# Patient Record
Sex: FEMALE | Race: WHITE | Employment: OTHER | URBAN - METROPOLITAN AREA
[De-identification: names, ages, dates, MRNs, and addresses within clinical notes are randomized per-mention and may not be internally consistent; named-entity substitution may affect disease eponyms.]

---

## 2017-01-16 ENCOUNTER — TELEPHONE (OUTPATIENT)
Dept: FAMILY MEDICINE CLINIC | Facility: CLINIC | Age: 42
End: 2017-01-16

## 2017-01-16 NOTE — TELEPHONE ENCOUNTER
A request was received from [address] [place] [address] Prisca  [address] for medical records for the past 5 years. The request was sent to [employer] on 1/16/17.

## 2017-01-26 ENCOUNTER — TELEPHONE (OUTPATIENT)
Dept: FAMILY MEDICINE CLINIC | Facility: CLINIC | Age: 42
End: 2017-01-26

## 2017-01-26 NOTE — TELEPHONE ENCOUNTER
Received notice from [employer] to cancel processing of APS request for this pt. They are asking to please Do Not Deposit the check should you have received it after this notice. Sending note in green bag to [employer] 1/26/17.

## 2017-02-23 ENCOUNTER — TELEPHONE (OUTPATIENT)
Dept: FAMILY MEDICINE CLINIC | Facility: CLINIC | Age: 42
End: 2017-02-23

## 2017-02-23 RX ORDER — LEVOTHYROXINE SODIUM 112 UG/1
TABLET ORAL
Qty: 30 TABLET | Refills: 1 | Status: SHIPPED | OUTPATIENT
Start: 2017-02-23 | End: 2018-10-30 | Stop reason: DRUGHIGH

## 2017-02-23 RX ORDER — VALACYCLOVIR HYDROCHLORIDE 500 MG/1
500 TABLET, FILM COATED ORAL DAILY
Qty: 30 TABLET | Refills: 0 | Status: SHIPPED | OUTPATIENT
Start: 2017-02-23 | End: 2017-04-17

## 2017-02-23 RX ORDER — LEVOTHYROXINE SODIUM 0.12 MG/1
TABLET ORAL
Qty: 30 TABLET | Refills: 1 | Status: SHIPPED | OUTPATIENT
Start: 2017-02-23 | End: 2017-08-16

## 2017-02-23 NOTE — TELEPHONE ENCOUNTER
Pt has to discuss these medications with the nurse because she has insurance issues and she needs to possibly have different dosages for them,  Levothyroxine 125 and 112mg and Valacyclovir 1000mg.

## 2017-02-23 NOTE — TELEPHONE ENCOUNTER
Spoke to patient and just completed a divorce and if she goes thru [employer] and upgrades her membership she can get the scripts for $10 but the scripts have to be a 30 day script each for the levothyroxine and the Valcyclovir is only covered at 500mg dose

## 2017-04-17 RX ORDER — VALACYCLOVIR HYDROCHLORIDE 500 MG/1
TABLET, FILM COATED ORAL
Qty: 30 TABLET | Refills: 0 | Status: SHIPPED | OUTPATIENT
Start: 2017-04-17 | End: 2017-05-24

## 2017-05-22 RX ORDER — VALACYCLOVIR HYDROCHLORIDE 500 MG/1
TABLET, FILM COATED ORAL
Qty: 30 TABLET | Refills: 0 | OUTPATIENT
Start: 2017-05-22

## 2017-05-25 RX ORDER — VALACYCLOVIR HYDROCHLORIDE 500 MG/1
TABLET, FILM COATED ORAL
Qty: 30 TABLET | Refills: 0 | Status: SHIPPED | OUTPATIENT
Start: 2017-05-25 | End: 2017-07-06

## 2017-05-25 RX ORDER — VALACYCLOVIR HYDROCHLORIDE 500 MG/1
TABLET, FILM COATED ORAL
Qty: 30 TABLET | Refills: 0 | Status: CANCELLED | OUTPATIENT
Start: 2017-05-25

## 2017-05-25 NOTE — TELEPHONE ENCOUNTER
Natalie Bourgeois    Her last OV was 8/2016.   She needs an appt for this med or ok to refill?  thanks

## 2017-07-07 RX ORDER — VALACYCLOVIR HYDROCHLORIDE 500 MG/1
TABLET, FILM COATED ORAL
Qty: 30 TABLET | Refills: 2 | Status: SHIPPED | OUTPATIENT
Start: 2017-07-07 | End: 2017-10-23

## 2017-08-09 RX ORDER — LEVOTHYROXINE SODIUM 112 UG/1
TABLET ORAL
Qty: 30 TABLET | Refills: 1 | OUTPATIENT
Start: 2017-08-09

## 2017-08-09 RX ORDER — LEVOTHYROXINE SODIUM 0.12 MG/1
TABLET ORAL
Qty: 30 TABLET | Refills: 1 | OUTPATIENT
Start: 2017-08-09

## 2017-08-15 ENCOUNTER — LAB ENCOUNTER (OUTPATIENT)
Dept: LAB | Age: 42
End: 2017-08-15
Attending: FAMILY MEDICINE
Payer: COMMERCIAL

## 2017-08-15 ENCOUNTER — OFFICE VISIT (OUTPATIENT)
Dept: FAMILY MEDICINE CLINIC | Facility: CLINIC | Age: 42
End: 2017-08-15

## 2017-08-15 VITALS
SYSTOLIC BLOOD PRESSURE: 112 MMHG | RESPIRATION RATE: 16 BRPM | TEMPERATURE: 98 F | HEART RATE: 88 BPM | OXYGEN SATURATION: 99 % | DIASTOLIC BLOOD PRESSURE: 74 MMHG | BODY MASS INDEX: 24.27 KG/M2 | WEIGHT: 151 LBS | HEIGHT: 66.25 IN

## 2017-08-15 DIAGNOSIS — E89.0 POST-SURGICAL HYPOTHYROIDISM: ICD-10-CM

## 2017-08-15 DIAGNOSIS — Z85.850 HISTORY OF PAPILLARY ADENOCARCINOMA OF THYROID: ICD-10-CM

## 2017-08-15 DIAGNOSIS — G43.909 MIGRAINE WITHOUT STATUS MIGRAINOSUS, NOT INTRACTABLE, UNSPECIFIED MIGRAINE TYPE: ICD-10-CM

## 2017-08-15 DIAGNOSIS — Z00.00 WELL FEMALE EXAM WITHOUT GYNECOLOGICAL EXAM: Primary | ICD-10-CM

## 2017-08-15 DIAGNOSIS — Z85.850 HISTORY OF THYROID CANCER: ICD-10-CM

## 2017-08-15 DIAGNOSIS — Z00.00 LABORATORY EXAMINATION ORDERED AS PART OF A ROUTINE GENERAL MEDICAL EXAMINATION: ICD-10-CM

## 2017-08-15 DIAGNOSIS — Z00.00 WELL FEMALE EXAM WITHOUT GYNECOLOGICAL EXAM: ICD-10-CM

## 2017-08-15 LAB
25-HYDROXYVITAMIN D (TOTAL): 31.9 NG/ML (ref 30–100)
ALBUMIN SERPL-MCNC: 3.9 G/DL (ref 3.5–4.8)
ALP LIVER SERPL-CCNC: 86 U/L (ref 37–98)
ALT SERPL-CCNC: 12 U/L (ref 14–54)
AST SERPL-CCNC: 16 U/L (ref 15–41)
BASOPHILS # BLD AUTO: 0.06 X10(3) UL (ref 0–0.1)
BASOPHILS NFR BLD AUTO: 1.1 %
BILIRUB SERPL-MCNC: 0.3 MG/DL (ref 0.1–2)
BUN BLD-MCNC: 10 MG/DL (ref 8–20)
CALCIUM BLD-MCNC: 8.9 MG/DL (ref 8.3–10.3)
CHLORIDE: 107 MMOL/L (ref 101–111)
CHOLEST SMN-MCNC: 201 MG/DL (ref ?–200)
CO2: 26 MMOL/L (ref 22–32)
CREAT BLD-MCNC: 0.66 MG/DL (ref 0.55–1.02)
EOSINOPHIL # BLD AUTO: 0.14 X10(3) UL (ref 0–0.3)
EOSINOPHIL NFR BLD AUTO: 2.5 %
ERYTHROCYTE [DISTWIDTH] IN BLOOD BY AUTOMATED COUNT: 13.2 % (ref 11.5–16)
FREE T4: 1 NG/DL (ref 0.9–1.8)
GLUCOSE BLD-MCNC: 84 MG/DL (ref 70–99)
HCT VFR BLD AUTO: 39.5 % (ref 34–50)
HDLC SERPL-MCNC: 66 MG/DL (ref 45–?)
HDLC SERPL: 3.05 {RATIO} (ref ?–4.44)
HGB BLD-MCNC: 12.9 G/DL (ref 12–16)
IMMATURE GRANULOCYTE COUNT: 0.03 X10(3) UL (ref 0–1)
IMMATURE GRANULOCYTE RATIO %: 0.5 %
LDLC SERPL CALC-MCNC: 110 MG/DL (ref ?–130)
LDLC SERPL-MCNC: 25 MG/DL (ref 5–40)
LYMPHOCYTES # BLD AUTO: 1.81 X10(3) UL (ref 0.9–4)
LYMPHOCYTES NFR BLD AUTO: 32 %
M PROTEIN MFR SERPL ELPH: 7.1 G/DL (ref 6.1–8.3)
MCH RBC QN AUTO: 30.1 PG (ref 27–33.2)
MCHC RBC AUTO-ENTMCNC: 32.7 G/DL (ref 31–37)
MCV RBC AUTO: 92.1 FL (ref 81–100)
MONOCYTES # BLD AUTO: 0.31 X10(3) UL (ref 0.1–0.6)
MONOCYTES NFR BLD AUTO: 5.5 %
NEUTROPHIL ABS PRELIM: 3.31 X10 (3) UL (ref 1.3–6.7)
NEUTROPHILS # BLD AUTO: 3.31 X10(3) UL (ref 1.3–6.7)
NEUTROPHILS NFR BLD AUTO: 58.4 %
NONHDLC SERPL-MCNC: 135 MG/DL (ref ?–130)
PLATELET # BLD AUTO: 208 10(3)UL (ref 150–450)
POTASSIUM SERPL-SCNC: 4.2 MMOL/L (ref 3.6–5.1)
RBC # BLD AUTO: 4.29 X10(6)UL (ref 3.8–5.1)
RED CELL DISTRIBUTION WIDTH-SD: 44.8 FL (ref 35.1–46.3)
SODIUM SERPL-SCNC: 140 MMOL/L (ref 136–144)
T3 SERPL-MCNC: 71 NG/DL (ref 60–181)
TRIGLYCERIDES: 126 MG/DL (ref ?–150)
TSI SER-ACNC: 2.94 MIU/ML (ref 0.35–5.5)
WBC # BLD AUTO: 5.7 X10(3) UL (ref 4–13)

## 2017-08-15 PROCEDURE — 36415 COLL VENOUS BLD VENIPUNCTURE: CPT

## 2017-08-15 PROCEDURE — 99396 PREV VISIT EST AGE 40-64: CPT | Performed by: FAMILY MEDICINE

## 2017-08-15 PROCEDURE — 85025 COMPLETE CBC W/AUTO DIFF WBC: CPT

## 2017-08-15 PROCEDURE — 82306 VITAMIN D 25 HYDROXY: CPT

## 2017-08-15 PROCEDURE — G0438 PPPS, INITIAL VISIT: HCPCS | Performed by: FAMILY MEDICINE

## 2017-08-15 PROCEDURE — 84443 ASSAY THYROID STIM HORMONE: CPT

## 2017-08-15 PROCEDURE — 80053 COMPREHEN METABOLIC PANEL: CPT

## 2017-08-15 PROCEDURE — 80061 LIPID PANEL: CPT

## 2017-08-15 PROCEDURE — 84480 ASSAY TRIIODOTHYRONINE (T3): CPT

## 2017-08-15 PROCEDURE — 84439 ASSAY OF FREE THYROXINE: CPT

## 2017-08-15 RX ORDER — SUMATRIPTAN 100 MG/1
100 TABLET, FILM COATED ORAL EVERY 2 HOUR PRN
Qty: 9 TABLET | Refills: 1 | Status: SHIPPED | OUTPATIENT
Start: 2017-08-15 | End: 2018-10-30

## 2017-08-15 RX ORDER — CODEINE/BUTALBITAL/ASA/CAFFEIN 30-50-325
CAPSULE ORAL
Qty: 30 CAPSULE | Refills: 0 | Status: SHIPPED | OUTPATIENT
Start: 2017-08-15 | End: 2018-07-17

## 2017-08-15 NOTE — PROGRESS NOTES
HPI:   Jw Felton is a 40year old female who presents for a complete physical exam. Symptoms: IUD placed 3/2015 no periods. Migraines vary has had them since 7 y/o Fiorinal only thing that works.  Imitrex generic is covered nasal spray version last F 125 MCG Oral Tab Alternate 125mcg every other day with 112mcg Disp: 30 tablet Rfl: 1   Levothyroxine Sodium 112 MCG Oral Tab Alternate 112mcg every other day with 125mcg Disp: 30 tablet Rfl: 1   nystatin-triamcinolone 885480-1.1 UNIT/GM-% External Cream Ap pain  CARDIOVASCULAR: denies chest pain or tightness on exertion: no edema  VASCULAR: denies leg cramps  GI: denies abdominal pain, bowel movement changes, blood in stool  : denies urinary problems, vaginal discharge or discomfort,   MUSCULOSKELETAL: den medical examination      Orders Placed This Encounter      mmm cbc      mmm cmp      mmm lipid      mmm TSH and Free T4 [E]      mmm vit d      mmm (T3) Total [E]    Meds & Refills for this Visit:  Signed Prescriptions Disp Refills    SUMAtriptan Succinate

## 2017-08-15 NOTE — PROGRESS NOTES
Cholesterol mild elevation try to reduce saturated fats eat more fruits vegetables repeat yearly. TSH is too high patient should now start taking levothyroxine 125 mcg every day. Repeat TSH, T4 free and T3 total in 3 months.   Order future tests/medicatio

## 2017-08-16 ENCOUNTER — TELEPHONE (OUTPATIENT)
Dept: FAMILY MEDICINE CLINIC | Facility: CLINIC | Age: 42
End: 2017-08-16

## 2017-08-16 DIAGNOSIS — E03.9 HYPOTHYROIDISM, UNSPECIFIED TYPE: Primary | ICD-10-CM

## 2017-08-16 RX ORDER — LEVOTHYROXINE SODIUM 0.12 MG/1
TABLET ORAL
Qty: 90 TABLET | Refills: 0 | Status: SHIPPED | OUTPATIENT
Start: 2017-08-16 | End: 2017-12-06

## 2017-08-16 NOTE — TELEPHONE ENCOUNTER
Cholesterol mild elevation try to reduce saturated fats eat more fruits vegetables repeat yearly. TSH is too high start taking levothyroxine 125 mcg every day.  Repeat TSH, T4 free and T3 total in 3 months.    Sincerely,   Hunter Inman PA-C     Lab or

## 2017-08-16 NOTE — TELEPHONE ENCOUNTER
LevotLevothyroxine Sodium 125 MCG Oral Tabhyroxine Sodium 125 MCG Oral Tab    Pt was seen yesterday. She was suppose to have refill sent to [facility]. Pharmacy doesn't have anything.

## 2017-10-23 RX ORDER — VALACYCLOVIR HYDROCHLORIDE 500 MG/1
TABLET, FILM COATED ORAL
Qty: 30 TABLET | Refills: 2 | Status: SHIPPED | OUTPATIENT
Start: 2017-10-23 | End: 2018-02-12

## 2017-12-06 RX ORDER — LEVOTHYROXINE SODIUM 0.12 MG/1
TABLET ORAL
Qty: 30 TABLET | Refills: 0 | Status: SHIPPED | OUTPATIENT
Start: 2017-12-06 | End: 2018-01-10

## 2017-12-06 NOTE — TELEPHONE ENCOUNTER
rx approved qty 30 NR  Patient past due to complete thyroid labs  Copy of lab orders mailed to patient

## 2017-12-28 ENCOUNTER — LAB ENCOUNTER (OUTPATIENT)
Dept: LAB | Age: 42
End: 2017-12-28
Attending: FAMILY MEDICINE
Payer: COMMERCIAL

## 2017-12-28 DIAGNOSIS — E03.9 HYPOTHYROIDISM, UNSPECIFIED TYPE: ICD-10-CM

## 2017-12-28 PROCEDURE — 84480 ASSAY TRIIODOTHYRONINE (T3): CPT

## 2017-12-28 PROCEDURE — 36415 COLL VENOUS BLD VENIPUNCTURE: CPT

## 2017-12-28 PROCEDURE — 84439 ASSAY OF FREE THYROXINE: CPT

## 2017-12-28 PROCEDURE — 84443 ASSAY THYROID STIM HORMONE: CPT

## 2018-01-10 RX ORDER — LEVOTHYROXINE SODIUM 0.12 MG/1
TABLET ORAL
Qty: 90 TABLET | Refills: 1 | Status: SHIPPED | OUTPATIENT
Start: 2018-01-10 | End: 2018-10-02

## 2018-02-12 RX ORDER — VALACYCLOVIR HYDROCHLORIDE 500 MG/1
500 TABLET, FILM COATED ORAL DAILY
Qty: 30 TABLET | Refills: 2 | Status: SHIPPED
Start: 2018-02-12 | End: 2018-07-03

## 2018-02-12 NOTE — TELEPHONE ENCOUNTER
From: Keara Rome  Sent: 2/12/2018 11:48 AM CST  Subject: Medication Renewal Request    Keara Rome would like a refill of the following medications:     VALACYCLOVIR  MG Oral Tab Molly Moncada PA-C]   Patient Comment: [facility]

## 2018-07-03 RX ORDER — VALACYCLOVIR HYDROCHLORIDE 500 MG/1
TABLET, FILM COATED ORAL
Qty: 30 TABLET | Refills: 2 | Status: SHIPPED | OUTPATIENT
Start: 2018-07-03 | End: 2018-11-12

## 2018-07-17 RX ORDER — CODEINE/BUTALBITAL/ASA/CAFFEIN 30-50-325
CAPSULE ORAL
Qty: 30 CAPSULE | Refills: 0 | Status: SHIPPED | OUTPATIENT
Start: 2018-07-17 | End: 2018-10-30

## 2018-07-17 NOTE — TELEPHONE ENCOUNTER
The patient is requesting a refill on her migraine medication. She asked that we make sure to send over the one with aspirin in it because when she picked it up last time it had Tylenol in it.

## 2018-10-02 RX ORDER — LEVOTHYROXINE SODIUM 0.12 MG/1
TABLET ORAL
Qty: 30 TABLET | Refills: 0 | Status: SHIPPED | OUTPATIENT
Start: 2018-10-02 | End: 2019-07-17

## 2018-10-02 NOTE — TELEPHONE ENCOUNTER
Levothyroxine approved qty 30 NR  Patient needs appt for further refills  Please call to schedule appt for annual wellness  [phone] (home)

## 2018-10-30 ENCOUNTER — OFFICE VISIT (OUTPATIENT)
Dept: FAMILY MEDICINE CLINIC | Facility: CLINIC | Age: 43
End: 2018-10-30

## 2018-10-30 ENCOUNTER — LAB ENCOUNTER (OUTPATIENT)
Dept: LAB | Age: 43
End: 2018-10-30
Attending: FAMILY MEDICINE
Payer: COMMERCIAL

## 2018-10-30 VITALS
WEIGHT: 150 LBS | HEIGHT: 67 IN | SYSTOLIC BLOOD PRESSURE: 90 MMHG | HEART RATE: 80 BPM | BODY MASS INDEX: 23.54 KG/M2 | DIASTOLIC BLOOD PRESSURE: 60 MMHG

## 2018-10-30 DIAGNOSIS — Z00.00 LABORATORY EXAMINATION ORDERED AS PART OF A ROUTINE GENERAL MEDICAL EXAMINATION: ICD-10-CM

## 2018-10-30 DIAGNOSIS — Z12.4 SCREENING FOR MALIGNANT NEOPLASM OF CERVIX: ICD-10-CM

## 2018-10-30 DIAGNOSIS — Z85.850 HISTORY OF PAPILLARY ADENOCARCINOMA OF THYROID: ICD-10-CM

## 2018-10-30 DIAGNOSIS — E89.0 POST-SURGICAL HYPOTHYROIDISM: ICD-10-CM

## 2018-10-30 DIAGNOSIS — Z86.19 HISTORY OF HERPES GENITALIS: ICD-10-CM

## 2018-10-30 DIAGNOSIS — G43.909 MIGRAINE WITHOUT STATUS MIGRAINOSUS, NOT INTRACTABLE, UNSPECIFIED MIGRAINE TYPE: ICD-10-CM

## 2018-10-30 DIAGNOSIS — Z12.39 BREAST CANCER SCREENING: ICD-10-CM

## 2018-10-30 DIAGNOSIS — Z01.419 WELL FEMALE EXAM WITH ROUTINE GYNECOLOGICAL EXAM: Primary | ICD-10-CM

## 2018-10-30 DIAGNOSIS — D22.9 MULTIPLE PIGMENTED NEVI: ICD-10-CM

## 2018-10-30 PROCEDURE — 84439 ASSAY OF FREE THYROXINE: CPT

## 2018-10-30 PROCEDURE — 80061 LIPID PANEL: CPT

## 2018-10-30 PROCEDURE — 84443 ASSAY THYROID STIM HORMONE: CPT

## 2018-10-30 PROCEDURE — 36415 COLL VENOUS BLD VENIPUNCTURE: CPT

## 2018-10-30 PROCEDURE — 88175 CYTOPATH C/V AUTO FLUID REDO: CPT | Performed by: FAMILY MEDICINE

## 2018-10-30 PROCEDURE — 87624 HPV HI-RISK TYP POOLED RSLT: CPT | Performed by: FAMILY MEDICINE

## 2018-10-30 PROCEDURE — 85025 COMPLETE CBC W/AUTO DIFF WBC: CPT

## 2018-10-30 PROCEDURE — 80053 COMPREHEN METABOLIC PANEL: CPT

## 2018-10-30 PROCEDURE — 99396 PREV VISIT EST AGE 40-64: CPT | Performed by: FAMILY MEDICINE

## 2018-10-30 PROCEDURE — 84480 ASSAY TRIIODOTHYRONINE (T3): CPT

## 2018-10-30 RX ORDER — LEVOTHYROXINE SODIUM 137 UG/1
137 CAPSULE ORAL EVERY MORNING
Qty: 30 CAPSULE | Refills: 5 | Status: SHIPPED | OUTPATIENT
Start: 2018-10-30 | End: 2019-07-17

## 2018-10-30 RX ORDER — CODEINE/BUTALBITAL/ASA/CAFFEIN 30-50-325
CAPSULE ORAL
Qty: 30 CAPSULE | Refills: 0 | Status: SHIPPED | OUTPATIENT
Start: 2018-10-30 | End: 2019-11-21

## 2018-10-31 ENCOUNTER — TELEPHONE (OUTPATIENT)
Dept: FAMILY MEDICINE CLINIC | Facility: CLINIC | Age: 43
End: 2018-10-31

## 2018-10-31 DIAGNOSIS — E89.0 POST-SURGICAL HYPOTHYROIDISM: Primary | ICD-10-CM

## 2018-10-31 NOTE — TELEPHONE ENCOUNTER
----- Message from Darrel Shelby PA-C sent at 10/30/2018  8:13 PM CDT -----  TSH goal is below 1 increase levothyroxine to 137 mcg from 125 mcg. Repeat TSH  in 3 months.   CMP, CBC and lipids normal.  Order future tests/medications sent to my chart

## 2018-10-31 NOTE — TELEPHONE ENCOUNTER
An order was placed for a TSH to be done in 3 months. Broderick Jiménez already sent the new dose of Levothyroxine to the patient's pharmacy yesterday.

## 2018-10-31 NOTE — PROGRESS NOTES
TSH goal is below 1 increase levothyroxine to 137 mcg from 125 mcg. Repeat TSH  in 3 months.   CMP, CBC and lipids normal.  Order future tests/medications sent to my chart

## 2018-11-02 ENCOUNTER — HOSPITAL ENCOUNTER (OUTPATIENT)
Dept: MAMMOGRAPHY | Age: 43
Discharge: HOME | End: 2018-11-02
Attending: FAMILY MEDICINE
Payer: COMMERCIAL

## 2018-11-02 DIAGNOSIS — Z12.39 BREAST CANCER SCREENING: ICD-10-CM

## 2018-11-02 PROCEDURE — 77067 SCR MAMMO BI INCL CAD: CPT | Performed by: FAMILY MEDICINE

## 2018-11-12 RX ORDER — VALACYCLOVIR HYDROCHLORIDE 500 MG/1
TABLET, FILM COATED ORAL
Qty: 30 TABLET | Refills: 2 | Status: SHIPPED | OUTPATIENT
Start: 2018-11-12 | End: 2019-03-05

## 2019-02-28 ENCOUNTER — LAB ENCOUNTER (OUTPATIENT)
Dept: LAB | Age: 44
End: 2019-02-28
Attending: FAMILY MEDICINE
Payer: COMMERCIAL

## 2019-02-28 DIAGNOSIS — E89.0 POST-SURGICAL HYPOTHYROIDISM: ICD-10-CM

## 2019-02-28 LAB — TSI SER-ACNC: 0.23 MIU/ML (ref 0.36–3.74)

## 2019-02-28 PROCEDURE — 36415 COLL VENOUS BLD VENIPUNCTURE: CPT

## 2019-02-28 PROCEDURE — 84443 ASSAY THYROID STIM HORMONE: CPT

## 2019-03-05 RX ORDER — VALACYCLOVIR HYDROCHLORIDE 500 MG/1
TABLET, FILM COATED ORAL
Qty: 90 TABLET | Refills: 0 | Status: SHIPPED | OUTPATIENT
Start: 2019-03-05 | End: 2019-07-29

## 2019-07-17 ENCOUNTER — TELEPHONE (OUTPATIENT)
Dept: FAMILY MEDICINE CLINIC | Facility: CLINIC | Age: 44
End: 2019-07-17

## 2019-07-17 DIAGNOSIS — E89.0 POST-SURGICAL HYPOTHYROIDISM: ICD-10-CM

## 2019-07-17 RX ORDER — LEVOTHYROXINE SODIUM 137 UG/1
137 CAPSULE ORAL EVERY MORNING
Qty: 90 CAPSULE | Refills: 2 | Status: SHIPPED | OUTPATIENT
Start: 2019-07-17 | End: 2019-11-21

## 2019-07-17 NOTE — TELEPHONE ENCOUNTER
Pt requesting refill of levothyroxine, passed protocol , refill approved, sent to pharmacy. Last TSH lab 2/28/2019    Last Time Medication was Filled:  10/2018    Last Office Visit with PCP: 10/30/2018    .

## 2019-07-29 DIAGNOSIS — Z86.19 HISTORY OF HERPES GENITALIS: Primary | ICD-10-CM

## 2019-07-29 RX ORDER — VALACYCLOVIR HYDROCHLORIDE 500 MG/1
TABLET, FILM COATED ORAL
Qty: 90 TABLET | Refills: 0 | Status: SHIPPED | OUTPATIENT
Start: 2019-07-29 | End: 2019-11-21

## 2019-10-17 ENCOUNTER — TELEPHONE (OUTPATIENT)
Dept: FAMILY MEDICINE CLINIC | Facility: CLINIC | Age: 44
End: 2019-10-17

## 2019-10-17 NOTE — TELEPHONE ENCOUNTER
Mirena would have to be ordered and she would need last appointment of the day. A physical can be done also as long as it is last appointment of the day. Not sure how long it takes for Mirena to come in.  Make sure we don't have any in the closet that got

## 2019-10-17 NOTE — TELEPHONE ENCOUNTER
Will initiate PA for Mirena before ordering. Once approved we will call the patient to schedule.      Await insurance response

## 2019-10-17 NOTE — TELEPHONE ENCOUNTER
Patient called state she is due for physical after 10/30 but would like her Mirena removed and a new Mirena inserted. Patients Mirena is not due to be removed until March 2020 and Bia Maxwell did not insert that one.

## 2019-10-29 NOTE — TELEPHONE ENCOUNTER
Unable to reach anyone at the [employer] [facility].     Called patient's health plan to verify if PA was needed. Per [facility] department no PA is required.      Left message for patient advising Masha was in and she can schedule wit

## 2019-11-21 ENCOUNTER — OFFICE VISIT (OUTPATIENT)
Dept: FAMILY MEDICINE CLINIC | Facility: CLINIC | Age: 44
End: 2019-11-21

## 2019-11-21 VITALS
HEART RATE: 88 BPM | HEIGHT: 66.73 IN | SYSTOLIC BLOOD PRESSURE: 88 MMHG | DIASTOLIC BLOOD PRESSURE: 60 MMHG | WEIGHT: 132 LBS | BODY MASS INDEX: 20.96 KG/M2

## 2019-11-21 DIAGNOSIS — E89.0 POST-SURGICAL HYPOTHYROIDISM: ICD-10-CM

## 2019-11-21 DIAGNOSIS — Z86.19 HISTORY OF HERPES GENITALIS: ICD-10-CM

## 2019-11-21 DIAGNOSIS — Z79.899 MEDICATION MANAGEMENT: ICD-10-CM

## 2019-11-21 DIAGNOSIS — Z00.00 WELL FEMALE EXAM WITHOUT GYNECOLOGICAL EXAM: Primary | ICD-10-CM

## 2019-11-21 DIAGNOSIS — Z30.433 ENCOUNTER FOR REMOVAL AND REINSERTION OF INTRAUTERINE CONTRACEPTIVE DEVICE (IUD): ICD-10-CM

## 2019-11-21 DIAGNOSIS — Z00.00 LABORATORY TESTS ORDERED AS PART OF A COMPLETE PHYSICAL EXAM (CPE): ICD-10-CM

## 2019-11-21 DIAGNOSIS — Z12.39 BREAST CANCER SCREENING: ICD-10-CM

## 2019-11-21 DIAGNOSIS — G43.909 MIGRAINE WITHOUT STATUS MIGRAINOSUS, NOT INTRACTABLE, UNSPECIFIED MIGRAINE TYPE: ICD-10-CM

## 2019-11-21 PROCEDURE — G0438 PPPS, INITIAL VISIT: HCPCS | Performed by: FAMILY MEDICINE

## 2019-11-21 PROCEDURE — 81025 URINE PREGNANCY TEST: CPT | Performed by: FAMILY MEDICINE

## 2019-11-21 PROCEDURE — 99213 OFFICE O/P EST LOW 20 MIN: CPT | Performed by: FAMILY MEDICINE

## 2019-11-21 PROCEDURE — 99396 PREV VISIT EST AGE 40-64: CPT | Performed by: FAMILY MEDICINE

## 2019-11-21 PROCEDURE — 58300 INSERT INTRAUTERINE DEVICE: CPT | Performed by: FAMILY MEDICINE

## 2019-11-21 PROCEDURE — 58301 REMOVE INTRAUTERINE DEVICE: CPT | Performed by: FAMILY MEDICINE

## 2019-11-21 RX ORDER — LEVOTHYROXINE SODIUM 137 UG/1
TABLET ORAL
Refills: 2 | COMMUNITY
Start: 2019-11-15 | End: 2021-03-24

## 2019-11-21 RX ORDER — VALACYCLOVIR HYDROCHLORIDE 500 MG/1
500 TABLET, FILM COATED ORAL
Qty: 90 TABLET | Refills: 3 | Status: SHIPPED | OUTPATIENT
Start: 2019-11-21 | End: 2021-01-06

## 2019-11-21 RX ORDER — LEVOTHYROXINE SODIUM 137 UG/1
137 CAPSULE ORAL EVERY MORNING
Qty: 90 CAPSULE | Refills: 2 | Status: SHIPPED | OUTPATIENT
Start: 2019-11-21 | End: 2019-12-21

## 2019-11-21 RX ORDER — CODEINE/BUTALBITAL/ASA/CAFFEIN 30-50-325
CAPSULE ORAL
Qty: 30 CAPSULE | Refills: 0 | Status: SHIPPED | OUTPATIENT
Start: 2019-11-21

## 2019-11-21 NOTE — PROGRESS NOTES
HPI:   Ace Rhodes is a 38year old female who presents for a complete physical exam. Symptoms: IUD mirena, getting it removed and new one placed is due to come out in March and due to heavy periods wants it replaced.   No history of PID rarely gets herpes Outpatient Medications   Medication Sig Dispense Refill   • Levothyroxine Sodium 137 MCG Oral Tab TAKE 1 TABLET BY MOUTH IN THE MORNING  2   • Levothyroxine Sodium 137 MCG Oral Cap Take 137 mcg by mouth every morning.  Stop 125 mcg 90 capsule 2   • valACYcl GENERAL: denies fevers, weakness, trouble sleeping or weight changes  SKIN: denies any unusual skin lesions or rashes  EYES:denies vision changes  HEENT: denies upper respiratory symptoms  LUNGS: denies cough or shortness of breath with exertion  CHEST: was prepped with betadine. The uterus was sounded to 6 cm. The Mirena was set to the sounded depth and the IUD was placed in the uterus. Strings were trimmed at 3 cm. The instruments were removed. The pt tolerated the procedure well.   She was asked t provided recommended low fat diet and aerobic exercise 30 minutes 3-4 times weekly. - TSH+FREE T4; Future    2.  Post-surgical hypothyroidism  Goal is TSH suppression last time TSH goal was perfect patient will get thyroid testing repeated  - Levothyroxin plan.  The patient is asked to return for complete physical yearly.

## 2019-11-25 ENCOUNTER — HOSPITAL ENCOUNTER (OUTPATIENT)
Dept: MAMMOGRAPHY | Age: 44
Discharge: HOME | End: 2019-11-25
Attending: FAMILY MEDICINE
Payer: COMMERCIAL

## 2019-11-25 DIAGNOSIS — Z12.39 BREAST CANCER SCREENING: ICD-10-CM

## 2019-11-25 PROCEDURE — 77067 SCR MAMMO BI INCL CAD: CPT | Performed by: FAMILY MEDICINE

## 2019-11-25 NOTE — PROGRESS NOTES
Mammogram is normal repeat in one year. Forward information to her about the [address] [place] [address]  Result sent to my chart.

## 2020-02-14 NOTE — TELEPHONE ENCOUNTER
After Visit Summary   8/3/2018    Kenn Dobson    MRN: [id number]           Patient Information     Date Of Birth          1986        Visit Information        Provider Department      8/3/2018 11:40 AM Vee Abdalla PA-C [facility]        Today's Diagnoses     Rectal pain    -  1       Follow-ups after your visit        Additional Services     COLORECTAL SURGERY REFERRAL       Your provider has referred you to: N: [facility] [phone]   [email or web address]    Referral Reason(s): rectal pain  Special Concerns: none  This referral is: Elective (week +)  It is OK to leave a message on patient's voicemail. [phone]    Please be aware that coverage of these services is subject to the terms and limitations of your health insurance plan.  Call member services at your health plan with any benefit or coverage questions.      Please bring the following with you to your appointment:    (1) Any X-Rays, CTs or MRIs which have been performed.  Contact the facility where they were done to arrange for  prior to your scheduled appointment.    (2) List of current medications  (3) This referral request   (4) Any documents/labs given to you for this referral                  Who to contact     If you have questions or need follow up information about today's clinic visit or your schedule please contact [facility] directly at [phone].  Normal or non-critical lab and imaging results will be communicated to you by MyChart, letter or phone within 4 business days after the clinic has received the results. If you do not hear from us within 7 days, please contact the clinic through MyChart or phone. If you have a critical or abnormal lab result, we will notify you by phone as soon as possible.  Submit refill requests through [employer] or call your pharmacy and they will forward the refill request to us. 
Pt requesting refill of VALACYCLOVIR  passed protocol , refill approved, sent to pharmacy
"Please allow 3 business days for your refill to be completed.          Additional Information About Your Visit        MyChart Information     [employer] lets you send messages to your doctor, view your test results, renew your prescriptions, schedule appointments and more. To sign up, go to [email or web address] . Click on \"Log in\" on the left side of the screen, which will take you to the Welcome page. Then click on \"Sign up Now\" on the right side of the page.     You will be asked to enter the access code listed below, as well as some personal information. Please follow the directions to create your username and password.     Your access code is: [email or web address]  Expires: 2018 12:09 PM     Your access code will  in 90 days. If you need help or a new code, please call your [place] clinic or [phone].        Care EveryWhere ID     This is your [facility] EveryWhere ID. This could be used by other organizations to access your [place] medical records  [id number]        Your Vitals Were     Pulse Temperature Respirations Pulse Oximetry BMI (Body Mass Index)       66 98.4  F (36.9  C) (Oral) 18 99% 27 kg/m2        Blood Pressure from Last 3 Encounters:   18 145/82   18 110/70   17 130/84    Weight from Last 3 Encounters:   18 175 lb (79.4 kg)   18 175 lb (79.4 kg)   17 164 lb (74.4 kg)              We Performed the Following     COLORECTAL SURGERY REFERRAL          Today's Medication Changes          These changes are accurate as of 8/3/18 12:09 PM.  If you have any questions, ask your nurse or doctor.               These medicines have changed or have updated prescriptions.        Dose/Directions    * hydrocortisone 25 MG Suppository   Commonly known as:  ANUSOL-HC   This may have changed:  Another medication with the same name was added. Make sure you understand how and when to take each.   Used for:  Internal hemorrhoid   Changed by:  Vee Abdalla PA-C        Dose:  "
25 mg   Place 1 suppository (25 mg) rectally 2 times daily as needed for hemorrhoids   Quantity:  20 suppository   Refills:  0       * hydrocortisone 25 MG Suppository   Commonly known as:  ANUSOL-HC   This may have changed:  You were already taking a medication with the same name, and this prescription was added. Make sure you understand how and when to take each.   Used for:  Rectal pain   Changed by:  Vee Abdalla PA-C        Dose:  25 mg   Place 1 suppository (25 mg) rectally 2 times daily   Quantity:  24 suppository   Refills:  1       * Notice:  This list has 2 medication(s) that are the same as other medications prescribed for you. Read the directions carefully, and ask your doctor or other care provider to review them with you.         Where to get your medicines      Some of these will need a paper prescription and others can be bought over the counter.  Ask your nurse if you have questions.     Bring a paper prescription for each of these medications     hydrocortisone 25 MG Suppository                Primary Care Provider Office Phone # Fax #    Patricia Lira MD PhD [phone]       [address]  [facility] [address]        Equal Access to Services     [facility]: Hadii theron almazan Sociera, waaxda luqadaha, qaybta kaalmachristian salinas, dat carl . So [place] [phone].    ATENCIÓN: Si habla español, tiene a badillo disposición servicios gratuitos de asistencia lingüística. [facility] [phone].    We comply with applicable federal civil rights laws and [place] laws. We do not discriminate on the basis of race, color, national origin, age, disability, sex, sexual orientation, or gender identity.            Thank you!     Thank you for choosing [facility]  for your care. Our goal is always to provide you with excellent care. Hearing back from our patients is one way we can continue to improve our services. Please take a few minutes 
to complete the written survey that you may receive in the mail after your visit with us. Thank you!             Your Updated Medication List - Protect others around you: Learn how to safely use, store and throw away your medicines at www.disposemymeds.org.          This list is accurate as of 8/3/18 12:09 PM.  Always use your most recent med list.                   Brand Name Dispense Instructions for use Diagnosis    cetirizine 10 MG tablet    zyrTEC    30 tablet    Take 1 tablet (10 mg) by mouth every evening    Allergic rhinitis, cause unspecified       fluticasone 50 MCG/ACT spray    FLONASE    1 Package    Spray 1-2 sprays into both nostrils daily    Allergic rhinitis, cause unspecified       * hydrocortisone 25 MG Suppository    ANUSOL-HC    20 suppository    Place 1 suppository (25 mg) rectally 2 times daily as needed for hemorrhoids    Internal hemorrhoid       * hydrocortisone 25 MG Suppository    ANUSOL-HC    24 suppository    Place 1 suppository (25 mg) rectally 2 times daily    Rectal pain       Multi-vitamin Tabs tablet      Take 1 tablet by mouth daily        UNABLE TO FIND      MEDICATION NAME: Tumeric        VITAMIN D (CHOLECALCIFEROL) PO      Take 1,000 Units by mouth daily        * Notice:  This list has 2 medication(s) that are the same as other medications prescribed for you. Read the directions carefully, and ask your doctor or other care provider to review them with you.      
65

## 2020-03-02 ENCOUNTER — LAB ENCOUNTER (OUTPATIENT)
Dept: LAB | Age: 45
End: 2020-03-02
Attending: FAMILY MEDICINE
Payer: COMMERCIAL

## 2020-03-02 ENCOUNTER — PATIENT MESSAGE (OUTPATIENT)
Dept: FAMILY MEDICINE CLINIC | Facility: CLINIC | Age: 45
End: 2020-03-02

## 2020-03-02 DIAGNOSIS — D64.9 LOW HEMOGLOBIN: ICD-10-CM

## 2020-03-02 DIAGNOSIS — R79.89 ABNORMAL CBC: ICD-10-CM

## 2020-03-02 DIAGNOSIS — Z00.00 LABORATORY TESTS ORDERED AS PART OF A COMPLETE PHYSICAL EXAM (CPE): ICD-10-CM

## 2020-03-02 DIAGNOSIS — D64.9 LOW HEMOGLOBIN: Primary | ICD-10-CM

## 2020-03-02 LAB
ALBUMIN SERPL-MCNC: 3.6 G/DL (ref 3.4–5)
ALBUMIN/GLOB SERPL: 1.1 {RATIO} (ref 1–2)
ALP LIVER SERPL-CCNC: 74 U/L (ref 37–98)
ALT SERPL-CCNC: 14 U/L (ref 13–56)
ANION GAP SERPL CALC-SCNC: 4 MMOL/L (ref 0–18)
AST SERPL-CCNC: 14 U/L (ref 15–37)
BASOPHILS # BLD AUTO: 0.04 X10(3) UL (ref 0–0.2)
BASOPHILS NFR BLD AUTO: 0.8 %
BILIRUB SERPL-MCNC: 0.3 MG/DL (ref 0.1–2)
BUN BLD-MCNC: 21 MG/DL (ref 7–18)
BUN/CREAT SERPL: 26.3 (ref 10–20)
CALCIUM BLD-MCNC: 8.4 MG/DL (ref 8.5–10.1)
CHLORIDE SERPL-SCNC: 110 MMOL/L (ref 98–112)
CHOLEST SMN-MCNC: 175 MG/DL (ref ?–200)
CO2 SERPL-SCNC: 27 MMOL/L (ref 21–32)
CREAT BLD-MCNC: 0.8 MG/DL (ref 0.55–1.02)
DEPRECATED HBV CORE AB SER IA-ACNC: 6.1 NG/ML (ref 12–240)
DEPRECATED RDW RBC AUTO: 47.6 FL (ref 35.1–46.3)
EOSINOPHIL # BLD AUTO: 0.13 X10(3) UL (ref 0–0.7)
EOSINOPHIL NFR BLD AUTO: 2.7 %
ERYTHROCYTE [DISTWIDTH] IN BLOOD BY AUTOMATED COUNT: 14.6 % (ref 11–15)
GLOBULIN PLAS-MCNC: 3.3 G/DL (ref 2.8–4.4)
GLUCOSE BLD-MCNC: 86 MG/DL (ref 70–99)
HCT VFR BLD AUTO: 33.7 % (ref 35–48)
HDLC SERPL-MCNC: 66 MG/DL (ref 40–59)
HGB BLD-MCNC: 10.7 G/DL (ref 12–16)
IMM GRANULOCYTES # BLD AUTO: 0.01 X10(3) UL (ref 0–1)
IMM GRANULOCYTES NFR BLD: 0.2 %
IRON SATURATION: 7 % (ref 15–50)
IRON SERPL-MCNC: 31 UG/DL (ref 50–170)
LDLC SERPL CALC-MCNC: 96 MG/DL (ref ?–100)
LYMPHOCYTES # BLD AUTO: 1.91 X10(3) UL (ref 1–4)
LYMPHOCYTES NFR BLD AUTO: 39.1 %
M PROTEIN MFR SERPL ELPH: 6.9 G/DL (ref 6.4–8.2)
MCH RBC QN AUTO: 28.4 PG (ref 26–34)
MCHC RBC AUTO-ENTMCNC: 31.8 G/DL (ref 31–37)
MCV RBC AUTO: 89.4 FL (ref 80–100)
MONOCYTES # BLD AUTO: 0.28 X10(3) UL (ref 0.1–1)
MONOCYTES NFR BLD AUTO: 5.7 %
NEUTROPHILS # BLD AUTO: 2.51 X10 (3) UL (ref 1.5–7.7)
NEUTROPHILS # BLD AUTO: 2.51 X10(3) UL (ref 1.5–7.7)
NEUTROPHILS NFR BLD AUTO: 51.5 %
NONHDLC SERPL-MCNC: 109 MG/DL (ref ?–130)
OSMOLALITY SERPL CALC.SUM OF ELEC: 294 MOSM/KG (ref 275–295)
PATIENT FASTING Y/N/NP: YES
PATIENT FASTING Y/N/NP: YES
PLATELET # BLD AUTO: 204 10(3)UL (ref 150–450)
POTASSIUM SERPL-SCNC: 4.2 MMOL/L (ref 3.5–5.1)
RBC # BLD AUTO: 3.77 X10(6)UL (ref 3.8–5.3)
SODIUM SERPL-SCNC: 141 MMOL/L (ref 136–145)
T4 FREE SERPL-MCNC: 1.2 NG/DL (ref 0.8–1.7)
TOTAL IRON BINDING CAPACITY: 419 UG/DL (ref 240–450)
TRANSFERRIN SERPL-MCNC: 281 MG/DL (ref 200–360)
TRIGL SERPL-MCNC: 64 MG/DL (ref 30–149)
TSI SER-ACNC: 0.48 MIU/ML (ref 0.36–3.74)
VLDLC SERPL CALC-MCNC: 13 MG/DL (ref 0–30)
WBC # BLD AUTO: 4.9 X10(3) UL (ref 4–11)

## 2020-03-02 PROCEDURE — 84443 ASSAY THYROID STIM HORMONE: CPT

## 2020-03-02 PROCEDURE — 83540 ASSAY OF IRON: CPT

## 2020-03-02 PROCEDURE — 80061 LIPID PANEL: CPT

## 2020-03-02 PROCEDURE — 80053 COMPREHEN METABOLIC PANEL: CPT

## 2020-03-02 PROCEDURE — 83550 IRON BINDING TEST: CPT

## 2020-03-02 PROCEDURE — 82728 ASSAY OF FERRITIN: CPT

## 2020-03-02 PROCEDURE — 85025 COMPLETE CBC W/AUTO DIFF WBC: CPT

## 2020-03-02 PROCEDURE — 36415 COLL VENOUS BLD VENIPUNCTURE: CPT

## 2020-03-02 PROCEDURE — 84439 ASSAY OF FREE THYROXINE: CPT

## 2020-03-02 NOTE — PROGRESS NOTES
Follow up in office to discuss. Iron levels are low including iron storage i.e. the ferritin level. Take the over-the-counter one of the 325 mg of iron daily with 500 mg vitamin C. Iron may cause constipation use OTC Miralax or stool softer if needed.

## 2020-03-02 NOTE — PROGRESS NOTES
Thyroid is at goal.  Lipids are normal.  CMP low calcium intake 500 mg calcium daily with vitamin D 2000 international units.   CBC low hemoglobin follow-up to discuss add iron study and ferritin  Start taking 325 mg iron over-the-counter with 500 mg vitami

## 2020-03-03 ENCOUNTER — TELEPHONE (OUTPATIENT)
Dept: FAMILY MEDICINE CLINIC | Facility: CLINIC | Age: 45
End: 2020-03-03

## 2020-03-03 DIAGNOSIS — E61.1 LOW IRON: Primary | ICD-10-CM

## 2020-03-03 NOTE — TELEPHONE ENCOUNTER
From: Saul Mcelroy  To: Viviana Kelley PA-C  Sent: 3/2/2020 5:14 PM CST  Subject: Test Results Question    Hey. .. I got my test results. Saw my iron and ferritin was off. I think I may have messed up.  I just realized that I donated blood last Friday (

## 2020-06-03 ENCOUNTER — LAB ENCOUNTER (OUTPATIENT)
Dept: LAB | Age: 45
End: 2020-06-03
Attending: FAMILY MEDICINE
Payer: COMMERCIAL

## 2020-06-03 DIAGNOSIS — E61.1 LOW IRON: ICD-10-CM

## 2020-06-03 PROCEDURE — 82728 ASSAY OF FERRITIN: CPT

## 2020-06-03 PROCEDURE — 85025 COMPLETE CBC W/AUTO DIFF WBC: CPT

## 2020-06-03 PROCEDURE — 83550 IRON BINDING TEST: CPT

## 2020-06-03 PROCEDURE — 36415 COLL VENOUS BLD VENIPUNCTURE: CPT

## 2020-06-03 PROCEDURE — 83540 ASSAY OF IRON: CPT

## 2020-06-04 NOTE — PROGRESS NOTES
CBC, iron and iron storage are now normal.  Make sure you are eating foods rich in iron.   Sent to my chart

## 2021-01-06 ENCOUNTER — OFFICE VISIT (OUTPATIENT)
Dept: FAMILY MEDICINE CLINIC | Facility: CLINIC | Age: 46
End: 2021-01-06
Payer: COMMERCIAL

## 2021-01-06 VITALS
HEART RATE: 84 BPM | BODY MASS INDEX: 24.3 KG/M2 | WEIGHT: 153 LBS | HEIGHT: 66.46 IN | TEMPERATURE: 97 F | DIASTOLIC BLOOD PRESSURE: 66 MMHG | SYSTOLIC BLOOD PRESSURE: 98 MMHG

## 2021-01-06 DIAGNOSIS — G43.709 CHRONIC MIGRAINE WITHOUT AURA WITHOUT STATUS MIGRAINOSUS, NOT INTRACTABLE: ICD-10-CM

## 2021-01-06 DIAGNOSIS — Z87.39 HISTORY OF OSTEOPENIA: ICD-10-CM

## 2021-01-06 DIAGNOSIS — Z97.5 IUD CONTRACEPTION: ICD-10-CM

## 2021-01-06 DIAGNOSIS — Z23 NEED FOR VACCINATION: ICD-10-CM

## 2021-01-06 DIAGNOSIS — Z13.820 SCREENING FOR OSTEOPOROSIS: ICD-10-CM

## 2021-01-06 DIAGNOSIS — Z00.00 WELL FEMALE EXAM WITHOUT GYNECOLOGICAL EXAM: Primary | ICD-10-CM

## 2021-01-06 DIAGNOSIS — Z12.31 VISIT FOR SCREENING MAMMOGRAM: ICD-10-CM

## 2021-01-06 DIAGNOSIS — Z00.00 LABORATORY TESTS ORDERED AS PART OF A COMPLETE PHYSICAL EXAM (CPE): ICD-10-CM

## 2021-01-06 DIAGNOSIS — Z86.19 HISTORY OF HERPES GENITALIS: ICD-10-CM

## 2021-01-06 DIAGNOSIS — E89.0 POST-SURGICAL HYPOTHYROIDISM: ICD-10-CM

## 2021-01-06 PROCEDURE — 90472 IMMUNIZATION ADMIN EACH ADD: CPT | Performed by: FAMILY MEDICINE

## 2021-01-06 PROCEDURE — 90471 IMMUNIZATION ADMIN: CPT | Performed by: FAMILY MEDICINE

## 2021-01-06 PROCEDURE — 3008F BODY MASS INDEX DOCD: CPT | Performed by: FAMILY MEDICINE

## 2021-01-06 PROCEDURE — 99396 PREV VISIT EST AGE 40-64: CPT | Performed by: FAMILY MEDICINE

## 2021-01-06 PROCEDURE — 3074F SYST BP LT 130 MM HG: CPT | Performed by: FAMILY MEDICINE

## 2021-01-06 PROCEDURE — 3078F DIAST BP <80 MM HG: CPT | Performed by: FAMILY MEDICINE

## 2021-01-06 PROCEDURE — 90686 IIV4 VACC NO PRSV 0.5 ML IM: CPT | Performed by: FAMILY MEDICINE

## 2021-01-06 PROCEDURE — 90715 TDAP VACCINE 7 YRS/> IM: CPT | Performed by: FAMILY MEDICINE

## 2021-01-06 RX ORDER — VALACYCLOVIR HYDROCHLORIDE 500 MG/1
500 TABLET, FILM COATED ORAL
Qty: 90 TABLET | Refills: 3 | Status: SHIPPED | OUTPATIENT
Start: 2021-01-06

## 2021-01-06 NOTE — PROGRESS NOTES
HPI:   Og Dhaliwal is a 41year old female who presents for a complete physical exam. Symptoms: IUD mirena is working well. Rare bleeding.    Herpes breakouts years ago no issues takes Valtrex once a day is sexually active with   Mom and sister w Transferrin 252 200 - 360 mg/dL    Total Iron Binding Capacity 375 240 - 450 ug/dL    % Saturation 28 15 - 50 %   CBC W/ DIFFERENTIAL   Result Value Ref Range    WBC 8.0 4.0 - 11.0 x10(3) uL    RBC 4.56 3.80 - 5.30 x10(6)uL    HGB 13.1 12.0 - 16.0 g/dL reported having laparoscopic sx of her belly   • OTHER SURGICAL HISTORY  12/1996    Pt. reported having thyroid cancer sx   • THYROIDECTOMY  6/1997    Total thyroidectomy      Family History   Problem Relation Age of Onset   • Diabetes Paternal Grandmother Sitting, Cuff Size: adult)   Pulse 84   Temp 96.6 °F (35.9 °C) (Skin)   Ht 5' 6.46\" (1.688 m)   Wt 153 lb (69.4 kg)   BMI 24.36 kg/m²   Body mass index is 24.36 kg/m².    GENERAL: well developed, well nourished and in no apparent distress  SKIN: no rashes, or ordered in this encounter       Imaging & Consults:  FLULAVAL INFLUENZA VACCINE QUAD PRESERVATIVE FREE 0.5 ML  TETANUS, DIPHTHERIA TOXOIDS AND ACELLULAR PERTUSIS VACCINE (TDAP), >7 YEARS, IM USE  DAINA SCREENING BILAT (CPT=77067)  XR DEXA BONE DENSITOMETR yearly.

## 2021-02-11 ENCOUNTER — TELEPHONE (OUTPATIENT)
Dept: FAMILY MEDICINE CLINIC | Facility: CLINIC | Age: 46
End: 2021-02-11

## 2021-02-11 NOTE — TELEPHONE ENCOUNTER
Per Augustine Cramer PA-C, the patient was notified that her Bone Density Report is normal and should be repeated in 3-4 years.

## 2021-03-24 RX ORDER — LEVOTHYROXINE SODIUM 137 UG/1
137 TABLET ORAL
Qty: 90 TABLET | Refills: 0 | Status: SHIPPED | OUTPATIENT
Start: 2021-03-24 | End: 2021-06-15

## 2021-03-24 NOTE — TELEPHONE ENCOUNTER
Requested Prescriptions     Signed Prescriptions Disp Refills   • Levothyroxine Sodium 137 MCG Oral Tab 90 tablet 0     Sig: Take 137 mcg by mouth before breakfast.     Authorizing Provider: Melissa Boyce     Ordering User: Savage wilson

## 2021-06-15 DIAGNOSIS — E89.0 POST-SURGICAL HYPOTHYROIDISM: Primary | ICD-10-CM

## 2021-06-15 RX ORDER — LEVOTHYROXINE SODIUM 0.12 MG/1
125 TABLET ORAL
Qty: 90 TABLET | Refills: 0 | Status: SHIPPED | OUTPATIENT
Start: 2021-06-15 | End: 2021-09-14

## 2021-06-15 NOTE — PROGRESS NOTES
Slight increase in the LDL otherwise lipid panel normal.  Reduce saturated fats and repeat in 1 year. Normal complete metabolic panel, complete blood count and T4 free. TSH is significantly too low.   Change levothyroxine from Levothyroxine 137 MCG to lev

## 2021-07-19 ENCOUNTER — TELEPHONE (OUTPATIENT)
Dept: FAMILY MEDICINE CLINIC | Facility: CLINIC | Age: 46
End: 2021-07-19

## 2021-07-19 NOTE — TELEPHONE ENCOUNTER
Kelley Butterfield from Dr. Bella Lobo office called to request a copy of the patient's 2-3 most recent office notes, labs, imaging, and any [facility] visits [facility] appointment tomorrow.   Per Lashae Andrade, she was asked to

## 2021-07-20 NOTE — TELEPHONE ENCOUNTER
Per faxed request, the patient's most recent Office Note and labs were successfully faxed to VENKATESH Styles M.D. at [phone].

## 2021-09-14 DIAGNOSIS — E89.0 POST-SURGICAL HYPOTHYROIDISM: Primary | ICD-10-CM

## 2021-09-14 LAB
T4, FREE: 1.7 NG/DL (ref 0.8–1.8)
TSH: 0.01 MIU/L

## 2021-09-14 RX ORDER — LEVOTHYROXINE SODIUM 112 UG/1
112 TABLET ORAL
Qty: 90 TABLET | Refills: 0 | Status: SHIPPED | OUTPATIENT
Start: 2021-09-14 | End: 2021-12-09 | Stop reason: DRUGHIGH

## 2021-12-09 ENCOUNTER — TELEPHONE (OUTPATIENT)
Dept: FAMILY MEDICINE CLINIC | Facility: CLINIC | Age: 46
End: 2021-12-09

## 2021-12-09 DIAGNOSIS — E89.0 POST-SURGICAL HYPOTHYROIDISM: Primary | ICD-10-CM

## 2021-12-09 RX ORDER — LEVOTHYROXINE SODIUM 0.1 MG/1
100 TABLET ORAL
Qty: 90 TABLET | Refills: 0 | Status: SHIPPED | OUTPATIENT
Start: 2021-12-09

## 2021-12-09 NOTE — PROGRESS NOTES
TSH again is too low reduce levothyroxine to 100 mcg/day. Repeat TSH and T4 free in 2 months.   Order future tests/medications sent to my chart

## 2021-12-09 NOTE — TELEPHONE ENCOUNTER
----- Message from Amalia Carias PA-C sent at 12/8/2021  8:25 PM CST -----  TSH again is too low reduce levothyroxine to 100 mcg/day. Repeat TSH and T4 free in 2 months.   Order future tests/medications sent to my chart

## 2022-02-12 LAB
T4, FREE: 1.7 NG/DL (ref 0.8–1.8)
TSH: 0.04 MIU/L

## 2022-02-14 ENCOUNTER — TELEPHONE (OUTPATIENT)
Dept: FAMILY MEDICINE CLINIC | Facility: CLINIC | Age: 47
End: 2022-02-14

## 2022-02-14 RX ORDER — LEVOTHYROXINE SODIUM 88 UG/1
88 TABLET ORAL
Qty: 90 TABLET | Refills: 0 | Status: SHIPPED | OUTPATIENT
Start: 2022-02-14

## 2022-02-14 NOTE — TELEPHONE ENCOUNTER
----- Message from Costa Fairbanks PA-C sent at 2/12/2022  8:41 AM CST -----  TSH is still too low decrease the levothyroxine from 100 mcg down to 88 mcg repeat TSH and T4 free and 8 to 12 weeks.   Order future tests/medications sent to my chart

## 2022-02-25 ENCOUNTER — OFFICE VISIT (OUTPATIENT)
Dept: FAMILY MEDICINE CLINIC | Facility: CLINIC | Age: 47
End: 2022-02-25
Payer: COMMERCIAL

## 2022-02-25 VITALS
HEART RATE: 92 BPM | DIASTOLIC BLOOD PRESSURE: 60 MMHG | TEMPERATURE: 98 F | SYSTOLIC BLOOD PRESSURE: 92 MMHG | HEIGHT: 66.97 IN | WEIGHT: 154 LBS | BODY MASS INDEX: 24.17 KG/M2

## 2022-02-25 DIAGNOSIS — E89.0 POST-SURGICAL HYPOTHYROIDISM: ICD-10-CM

## 2022-02-25 DIAGNOSIS — Z85.850 HISTORY OF PAPILLARY ADENOCARCINOMA OF THYROID: ICD-10-CM

## 2022-02-25 DIAGNOSIS — F52.31 ORGASM DISORDER: ICD-10-CM

## 2022-02-25 DIAGNOSIS — Z12.4 SCREENING FOR MALIGNANT NEOPLASM OF CERVIX: ICD-10-CM

## 2022-02-25 DIAGNOSIS — G43.909 MIGRAINE WITHOUT STATUS MIGRAINOSUS, NOT INTRACTABLE, UNSPECIFIED MIGRAINE TYPE: ICD-10-CM

## 2022-02-25 DIAGNOSIS — Z86.19 HISTORY OF HERPES GENITALIS: ICD-10-CM

## 2022-02-25 DIAGNOSIS — Z12.31 ENCOUNTER FOR SCREENING MAMMOGRAM FOR MALIGNANT NEOPLASM OF BREAST: ICD-10-CM

## 2022-02-25 DIAGNOSIS — Z13.29 SCREENING FOR ENDOCRINE, METABOLIC AND IMMUNITY DISORDER: ICD-10-CM

## 2022-02-25 DIAGNOSIS — Z97.5 IUD CONTRACEPTION: ICD-10-CM

## 2022-02-25 DIAGNOSIS — Z01.419 WELL FEMALE EXAM WITH ROUTINE GYNECOLOGICAL EXAM: Primary | ICD-10-CM

## 2022-02-25 DIAGNOSIS — Z13.0 SCREENING FOR ENDOCRINE, METABOLIC AND IMMUNITY DISORDER: ICD-10-CM

## 2022-02-25 DIAGNOSIS — Z13.220 LIPID SCREENING: ICD-10-CM

## 2022-02-25 DIAGNOSIS — Z13.228 SCREENING FOR ENDOCRINE, METABOLIC AND IMMUNITY DISORDER: ICD-10-CM

## 2022-02-25 PROCEDURE — 87624 HPV HI-RISK TYP POOLED RSLT: CPT | Performed by: FAMILY MEDICINE

## 2022-02-25 PROCEDURE — 99213 OFFICE O/P EST LOW 20 MIN: CPT | Performed by: FAMILY MEDICINE

## 2022-02-25 PROCEDURE — 88175 CYTOPATH C/V AUTO FLUID REDO: CPT | Performed by: FAMILY MEDICINE

## 2022-02-25 PROCEDURE — 3008F BODY MASS INDEX DOCD: CPT | Performed by: FAMILY MEDICINE

## 2022-02-25 PROCEDURE — 99396 PREV VISIT EST AGE 40-64: CPT | Performed by: FAMILY MEDICINE

## 2022-02-25 PROCEDURE — 3074F SYST BP LT 130 MM HG: CPT | Performed by: FAMILY MEDICINE

## 2022-02-25 PROCEDURE — 3078F DIAST BP <80 MM HG: CPT | Performed by: FAMILY MEDICINE

## 2022-02-25 RX ORDER — GALCANEZUMAB 120 MG/ML
1 INJECTION, SOLUTION SUBCUTANEOUS
COMMUNITY
Start: 2022-01-20

## 2022-02-25 RX ORDER — GLUCOSAMINE/MSM/CHONDROIT SULF 500-166.6
2 TABLET ORAL DAILY
COMMUNITY

## 2022-02-25 RX ORDER — AMITRIPTYLINE HYDROCHLORIDE 10 MG/1
TABLET, FILM COATED ORAL
COMMUNITY
Start: 2022-02-06

## 2022-02-25 RX ORDER — UBROGEPANT 100 MG/1
1 TABLET ORAL AS NEEDED
COMMUNITY
Start: 2022-02-17

## 2022-02-26 ENCOUNTER — PATIENT MESSAGE (OUTPATIENT)
Dept: FAMILY MEDICINE CLINIC | Facility: CLINIC | Age: 47
End: 2022-02-26

## 2022-02-26 PROBLEM — IMO0002 ORGASM DISORDER: Status: ACTIVE | Noted: 2022-02-26

## 2022-02-26 PROBLEM — F52.31 ORGASM DISORDER: Status: ACTIVE | Noted: 2022-02-26

## 2022-02-26 RX ORDER — CODEINE/BUTALBITAL/ASA/CAFFEIN 30-50-325
CAPSULE ORAL
Qty: 30 CAPSULE | Refills: 0 | Status: SHIPPED | OUTPATIENT
Start: 2022-02-26

## 2022-02-26 RX ORDER — DIAZEPAM 5 MG/1
TABLET ORAL NIGHTLY PRN
Qty: 5 TABLET | Refills: 0 | Status: SHIPPED | OUTPATIENT
Start: 2022-02-26

## 2022-02-26 RX ORDER — VALACYCLOVIR HYDROCHLORIDE 500 MG/1
500 TABLET, FILM COATED ORAL
Qty: 90 TABLET | Refills: 3 | Status: SHIPPED | OUTPATIENT
Start: 2022-02-26

## 2022-02-28 LAB — HPV I/H RISK 1 DNA SPEC QL NAA+PROBE: NEGATIVE

## 2022-03-09 LAB — LAST PAP RESULT: NORMAL

## 2022-03-10 RX ORDER — METRONIDAZOLE 7.5 MG/G
1 GEL VAGINAL NIGHTLY
Qty: 1 EACH | Refills: 0 | Status: SHIPPED | OUTPATIENT
Start: 2022-03-10 | End: 2022-03-15

## 2022-03-10 NOTE — PROGRESS NOTES
Pap bacterial vaginosis otherwise normal.    HPV is negative. This condition can occur when there is a change in the pH and does get corrected with the antibiotic or over-the-counter boric acid suppositories are used to  help bring the pH down. You can self monitor with pH vaginal strips to get the pH below 4.5, if you have any symptoms after using the prescription medication. Symptoms can consist of odor, discharge or burning. This is not a sexually transmitted bacteria. Treatment with oral probiotic and MetroGel vaginal applicator to be done at night for 5 nights. Do not drink alcohol when using this medication.

## 2022-05-12 LAB
ABSOLUTE BASOPHILS: 69 CELLS/UL (ref 0–200)
ABSOLUTE EOSINOPHILS: 208 CELLS/UL (ref 15–500)
ABSOLUTE LYMPHOCYTES: 1682 CELLS/UL (ref 850–3900)
ABSOLUTE MONOCYTES: 428 CELLS/UL (ref 200–950)
ABSOLUTE NEUTROPHILS: 3912 CELLS/UL (ref 1500–7800)
ALBUMIN/GLOBULIN RATIO: 1.8 (CALC) (ref 1–2.5)
ALBUMIN: 4.4 G/DL (ref 3.6–5.1)
ALKALINE PHOSPHATASE: 98 U/L (ref 31–125)
ALT: 12 U/L (ref 6–29)
AST: 20 U/L (ref 10–35)
BASOPHILS: 1.1 %
BILIRUBIN, TOTAL: 0.4 MG/DL (ref 0.2–1.2)
BUN: 19 MG/DL (ref 7–25)
CALCIUM: 9.1 MG/DL (ref 8.6–10.2)
CARBON DIOXIDE: 28 MMOL/L (ref 20–32)
CHLORIDE: 105 MMOL/L (ref 98–110)
CHOL/HDLC RATIO: 3.6 (CALC)
CHOLESTEROL, TOTAL: 212 MG/DL
CREATININE: 0.79 MG/DL (ref 0.5–1.1)
EGFR IF AFRICN AM: 104 ML/MIN/1.73M2
EGFR IF NONAFRICN AM: 90 ML/MIN/1.73M2
EOSINOPHILS: 3.3 %
GLOBULIN: 2.4 G/DL (CALC) (ref 1.9–3.7)
GLUCOSE: 83 MG/DL (ref 65–99)
HDL CHOLESTEROL: 59 MG/DL
HEMATOCRIT: 40 % (ref 35–45)
HEMOGLOBIN: 13.1 G/DL (ref 11.7–15.5)
LDL-CHOLESTEROL: 135 MG/DL (CALC)
LYMPHOCYTES: 26.7 %
MCH: 30.1 PG (ref 27–33)
MCHC: 32.8 G/DL (ref 32–36)
MCV: 92 FL (ref 80–100)
MONOCYTES: 6.8 %
MPV: 11.5 FL (ref 7.5–12.5)
NEUTROPHILS: 62.1 %
NON-HDL CHOLESTEROL: 153 MG/DL (CALC)
PLATELET COUNT: 248 THOUSAND/UL (ref 140–400)
POTASSIUM: 4.5 MMOL/L (ref 3.5–5.3)
PROTEIN, TOTAL: 6.8 G/DL (ref 6.1–8.1)
RDW: 13 % (ref 11–15)
RED BLOOD CELL COUNT: 4.35 MILLION/UL (ref 3.8–5.1)
SODIUM: 140 MMOL/L (ref 135–146)
T4, FREE: 1.2 NG/DL (ref 0.8–1.8)
TRIGLYCERIDES: 83 MG/DL
TSH: 7.8 MIU/L
WHITE BLOOD CELL COUNT: 6.3 THOUSAND/UL (ref 3.8–10.8)

## 2022-05-12 RX ORDER — LEVOTHYROXINE SODIUM 0.1 MG/1
100 TABLET ORAL DAILY
Qty: 90 TABLET | Refills: 0 | Status: SHIPPED | OUTPATIENT
Start: 2022-05-12 | End: 2023-05-07

## 2022-07-21 LAB
CHOL/HDLC RATIO: 3 (CALC)
CHOLESTEROL, TOTAL: 199 MG/DL
HDL CHOLESTEROL: 67 MG/DL
LDL-CHOLESTEROL: 112 MG/DL (CALC)
NON-HDL CHOLESTEROL: 132 MG/DL (CALC)
T4, FREE: 1.3 NG/DL (ref 0.8–1.8)
TRIGLYCERIDES: 96 MG/DL
TSH: 1.88 MIU/L

## 2022-07-22 RX ORDER — LEVOTHYROXINE SODIUM 0.1 MG/1
TABLET ORAL
Qty: 90 TABLET | Refills: 0 | Status: SHIPPED | OUTPATIENT
Start: 2022-07-22

## 2022-10-18 RX ORDER — LEVOTHYROXINE SODIUM 0.1 MG/1
TABLET ORAL
Qty: 90 TABLET | Refills: 2 | Status: SHIPPED | OUTPATIENT
Start: 2022-10-18

## 2023-02-13 DIAGNOSIS — Z86.19 HISTORY OF HERPES GENITALIS: ICD-10-CM

## 2023-02-13 RX ORDER — VALACYCLOVIR HYDROCHLORIDE 500 MG/1
TABLET, FILM COATED ORAL
Qty: 90 TABLET | Refills: 3 | Status: SHIPPED | OUTPATIENT
Start: 2023-02-13

## 2023-04-12 ENCOUNTER — OFFICE VISIT (OUTPATIENT)
Dept: FAMILY MEDICINE CLINIC | Facility: CLINIC | Age: 48
End: 2023-04-12
Payer: COMMERCIAL

## 2023-04-12 VITALS
HEIGHT: 66 IN | OXYGEN SATURATION: 98 % | WEIGHT: 158 LBS | SYSTOLIC BLOOD PRESSURE: 102 MMHG | DIASTOLIC BLOOD PRESSURE: 50 MMHG | BODY MASS INDEX: 25.39 KG/M2 | RESPIRATION RATE: 18 BRPM | HEART RATE: 92 BPM | TEMPERATURE: 98 F

## 2023-04-12 DIAGNOSIS — Z13.0 SCREENING FOR ENDOCRINE, NUTRITIONAL, METABOLIC AND IMMUNITY DISORDER: ICD-10-CM

## 2023-04-12 DIAGNOSIS — Z00.00 WELL FEMALE EXAM WITHOUT GYNECOLOGICAL EXAM: Primary | ICD-10-CM

## 2023-04-12 DIAGNOSIS — Z13.220 ENCOUNTER FOR LIPID SCREENING FOR CARDIOVASCULAR DISEASE: ICD-10-CM

## 2023-04-12 DIAGNOSIS — Z13.6 ENCOUNTER FOR LIPID SCREENING FOR CARDIOVASCULAR DISEASE: ICD-10-CM

## 2023-04-12 DIAGNOSIS — Z13.21 SCREENING FOR ENDOCRINE, NUTRITIONAL, METABOLIC AND IMMUNITY DISORDER: ICD-10-CM

## 2023-04-12 DIAGNOSIS — Z13.228 SCREENING FOR ENDOCRINE, NUTRITIONAL, METABOLIC AND IMMUNITY DISORDER: ICD-10-CM

## 2023-04-12 DIAGNOSIS — Z13.29 SCREENING FOR ENDOCRINE, NUTRITIONAL, METABOLIC AND IMMUNITY DISORDER: ICD-10-CM

## 2023-04-12 DIAGNOSIS — E89.0 POST-SURGICAL HYPOTHYROIDISM: ICD-10-CM

## 2023-04-12 DIAGNOSIS — Z12.31 VISIT FOR SCREENING MAMMOGRAM: ICD-10-CM

## 2023-04-12 PROCEDURE — 3074F SYST BP LT 130 MM HG: CPT | Performed by: FAMILY MEDICINE

## 2023-04-12 PROCEDURE — 99396 PREV VISIT EST AGE 40-64: CPT | Performed by: FAMILY MEDICINE

## 2023-04-12 PROCEDURE — 3078F DIAST BP <80 MM HG: CPT | Performed by: FAMILY MEDICINE

## 2023-04-12 PROCEDURE — 3008F BODY MASS INDEX DOCD: CPT | Performed by: FAMILY MEDICINE

## 2023-04-12 RX ORDER — METRONIDAZOLE 7.5 MG/G
GEL VAGINAL
COMMUNITY

## 2023-04-12 RX ORDER — BETAMETHASONE DIPROPIONATE 0.5 MG/G
LOTION TOPICAL
COMMUNITY

## 2023-04-13 ENCOUNTER — PATIENT MESSAGE (OUTPATIENT)
Dept: FAMILY MEDICINE CLINIC | Facility: CLINIC | Age: 48
End: 2023-04-13

## 2023-04-13 DIAGNOSIS — Z12.11 SCREENING FOR COLON CANCER: Primary | ICD-10-CM

## 2023-04-13 NOTE — TELEPHONE ENCOUNTER
From: Bekah Vega  To: Germaine Kimbrough PA-C  Sent: 4/13/2023 11:19 AM CDT  Subject: Colonoscopy Referral    Hello! Since I'm going to do my colonoscopy up closer to my home, could I get a referral to where my  has gone. Here's the information:   Dr. Anjum Hunter  [address]  [phone]    Thanks! Let me know if you need any more info!   Donavan Amezquita

## 2023-07-27 DIAGNOSIS — E78.2 MIXED HYPERLIPIDEMIA: Primary | ICD-10-CM

## 2023-07-27 LAB
ABSOLUTE BASOPHILS: 39 CELLS/UL (ref 0–200)
ABSOLUTE EOSINOPHILS: 118 CELLS/UL (ref 15–500)
ABSOLUTE LYMPHOCYTES: 1994 CELLS/UL (ref 850–3900)
ABSOLUTE MONOCYTES: 297 CELLS/UL (ref 200–950)
ABSOLUTE NEUTROPHILS: 3153 CELLS/UL (ref 1500–7800)
ALBUMIN/GLOBULIN RATIO: 1.7 (CALC) (ref 1–2.5)
ALBUMIN: 4.5 G/DL (ref 3.6–5.1)
ALKALINE PHOSPHATASE: 102 U/L (ref 31–125)
ALT: 11 U/L (ref 6–29)
AST: 15 U/L (ref 10–35)
BASOPHILS: 0.7 %
BILIRUBIN, TOTAL: 0.3 MG/DL (ref 0.2–1.2)
BUN: 15 MG/DL (ref 7–25)
CALCIUM: 9.1 MG/DL (ref 8.6–10.2)
CARBON DIOXIDE: 30 MMOL/L (ref 20–32)
CHLORIDE: 104 MMOL/L (ref 98–110)
CHOL/HDLC RATIO: 3.3 (CALC)
CHOLESTEROL, TOTAL: 237 MG/DL
CREATININE: 0.77 MG/DL (ref 0.5–0.99)
EGFR: 96 ML/MIN/1.73M2
EOSINOPHILS: 2.1 %
GLOBULIN: 2.6 G/DL (CALC) (ref 1.9–3.7)
GLUCOSE: 77 MG/DL (ref 65–99)
HDL CHOLESTEROL: 71 MG/DL
HEMATOCRIT: 39.3 % (ref 35–45)
HEMOGLOBIN: 13.4 G/DL (ref 11.7–15.5)
LDL-CHOLESTEROL: 146 MG/DL (CALC)
LYMPHOCYTES: 35.6 %
MCH: 30.1 PG (ref 27–33)
MCHC: 34.1 G/DL (ref 32–36)
MCV: 88.3 FL (ref 80–100)
MONOCYTES: 5.3 %
MPV: 10.9 FL (ref 7.5–12.5)
NEUTROPHILS: 56.3 %
NON-HDL CHOLESTEROL: 166 MG/DL (CALC)
PLATELET COUNT: 269 THOUSAND/UL (ref 140–400)
POTASSIUM: 4.4 MMOL/L (ref 3.5–5.3)
PROTEIN, TOTAL: 7.1 G/DL (ref 6.1–8.1)
RDW: 13.1 % (ref 11–15)
RED BLOOD CELL COUNT: 4.45 MILLION/UL (ref 3.8–5.1)
SODIUM: 140 MMOL/L (ref 135–146)
TRIGLYCERIDES: 92 MG/DL
TSH W/REFLEX TO FT4: 0.41 MIU/L
WHITE BLOOD CELL COUNT: 5.6 THOUSAND/UL (ref 3.8–10.8)

## 2024-01-30 RX ORDER — LEVOTHYROXINE SODIUM 0.1 MG/1
100 TABLET ORAL DAILY
Qty: 90 TABLET | Refills: 3 | OUTPATIENT
Start: 2024-01-30

## 2024-02-28 DIAGNOSIS — Z86.19 HISTORY OF HERPES GENITALIS: ICD-10-CM

## 2024-02-28 RX ORDER — VALACYCLOVIR HYDROCHLORIDE 500 MG/1
TABLET, FILM COATED ORAL
Qty: 90 TABLET | Refills: 3 | Status: SHIPPED | OUTPATIENT
Start: 2024-02-28

## 2024-02-28 NOTE — TELEPHONE ENCOUNTER
Requested Prescriptions     Signed Prescriptions Disp Refills    VALACYCLOVIR 500 MG Oral Tab 90 tablet 3     Sig: TAKE 1 TABLET DAILY     Authorizing Provider: CESILIA HOWELL     Ordering User: KAREN AGUIRRE     Refilled per protocol/OV notes

## 2024-03-19 RX ORDER — LEVOTHYROXINE SODIUM 0.1 MG/1
100 TABLET ORAL DAILY
Qty: 90 TABLET | Refills: 0 | Status: SHIPPED | OUTPATIENT
Start: 2024-03-19

## 2024-04-22 ENCOUNTER — OFFICE VISIT (OUTPATIENT)
Dept: FAMILY MEDICINE CLINIC | Facility: CLINIC | Age: 49
End: 2024-04-22
Payer: COMMERCIAL

## 2024-04-22 VITALS
HEIGHT: 66.54 IN | RESPIRATION RATE: 16 BRPM | OXYGEN SATURATION: 98 % | DIASTOLIC BLOOD PRESSURE: 64 MMHG | SYSTOLIC BLOOD PRESSURE: 122 MMHG | BODY MASS INDEX: 27.47 KG/M2 | HEART RATE: 88 BPM | TEMPERATURE: 98 F | WEIGHT: 173 LBS

## 2024-04-22 DIAGNOSIS — Z13.29 SCREENING FOR ENDOCRINE, NUTRITIONAL, METABOLIC AND IMMUNITY DISORDER: ICD-10-CM

## 2024-04-22 DIAGNOSIS — Z13.6 ENCOUNTER FOR LIPID SCREENING FOR CARDIOVASCULAR DISEASE: ICD-10-CM

## 2024-04-22 DIAGNOSIS — E89.0 POST-SURGICAL HYPOTHYROIDISM: ICD-10-CM

## 2024-04-22 DIAGNOSIS — Z13.228 SCREENING FOR ENDOCRINE, NUTRITIONAL, METABOLIC AND IMMUNITY DISORDER: ICD-10-CM

## 2024-04-22 DIAGNOSIS — Z13.21 SCREENING FOR ENDOCRINE, NUTRITIONAL, METABOLIC AND IMMUNITY DISORDER: ICD-10-CM

## 2024-04-22 DIAGNOSIS — Z00.00 WELL FEMALE EXAM WITHOUT GYNECOLOGICAL EXAM: Primary | ICD-10-CM

## 2024-04-22 DIAGNOSIS — Z13.0 SCREENING FOR ENDOCRINE, NUTRITIONAL, METABOLIC AND IMMUNITY DISORDER: ICD-10-CM

## 2024-04-22 DIAGNOSIS — Z12.31 VISIT FOR SCREENING MAMMOGRAM: ICD-10-CM

## 2024-04-22 DIAGNOSIS — E78.2 MIXED HYPERLIPIDEMIA: ICD-10-CM

## 2024-04-22 DIAGNOSIS — Z13.220 ENCOUNTER FOR LIPID SCREENING FOR CARDIOVASCULAR DISEASE: ICD-10-CM

## 2024-04-22 NOTE — PROGRESS NOTES
HPI:   Dianna Perez is a 49 year old female who presents for a complete physical exam,      --- Hyperlipidemia  FH high cholesterol Mom and sister.  Is willing to conseider RX and will get the heart scan  Patient had a vascular screening last year she thought it was a heart scan but from the description sounds like she had a carotid artery, aortic aneurysm and peripheral vascular disease screening which she states was all normal.  States she will check her records at home and verify if it was a heart scan but from the description sounds like just PVD screening    --- Weight gain  BMI 27 does not qualify for weight loss medication but would benefit from weight watchers did work well for her in the past.  States more weight gain since perimenopausal symptoms over the last few years    CPE  Symptoms: IUD mirena is working well in the fourth year of having IUD since she does not have any bleeding does not want to change it out would want to wait the full 8 years unless she experiences bleeding.  With IUD no bleeding is able to feel the strings  COVID vaccine  UTD booster 10/24/22 defers further vaccinations  Tdap 1/6/2021  Dental exams UTD  Eye exams UTD  Dermatologist every 6 months had squamous cell cancer also sister and aunt with melanoma   PHQ2 0  Sleep Apnea  None witnessed  Exercise   2 times per week walking has had weight gain is frustrated  DIet  good variable likes to eat sweets  FH CAD or cancer Sister melanoma; Dad nonhodgkins, PGF hodgkins lymphoma; Mat aunt breast cancer  Mom and sister with hyperlipidemia No FH AMI /CAD  Had bone densitiy 2/3/2021 was normal will repeat again at 51  Colonoscopy completed 10/12/2023 due 7 years 10/12/1929 no FH  colon cancer   Symptoms: IUD placed 11/21/19 no periods states that she does not want it have it removed unless she starts getting bleeding IUD can essentially work for 8 years with Mirena if no bleeding  Abnormal pap 25 years ago did cryo procedure normal since  Pap completed 2/25/2022 normal HPV and Pap smear  Sexually active yes  remarried happy  Last mammogram 5/9/2023;  Aunt breast cancer no ovarian cancer.   Sleep or emotional difficulty none   STD history herpes history no outbreaks for years and is on daily Valtrex wants to be on it to protect her    Social history patient has 2 adult children, and her second marriage is very happy non-smoker no alcohol and works as a  is on the computer a lot      Hypothyroid   Patient is on 100 mcg of levothyroxine  Hypothyroidism secondary to surgical intervention for thyroid cancer thyroidectomy 1997 thyroid cancer papillary and follicular has not been seeing endocrinologist for years they released her to her PCP several years Feels asymptomatic with her thyroid.    Herpes none breakouts years ago no issues takes Valtrex once a day is sexually active with  no side effects and Valtrex needs refill    Migraines   Presently following neurologist is on Ubrelvy helps.  Had taken Topamax caused severe side effects including tremors.    Labs are due.    Results for orders placed or performed in visit on 04/12/23   LIPID PANEL [7600] [Q]   Result Value Ref Range    CHOLESTEROL, TOTAL 237 (H) <200 mg/dL    HDL CHOLESTEROL 71 > OR = 50 mg/dL    TRIGLYCERIDES 92 <150 mg/dL    LDL-CHOLESTEROL 146 (H) mg/dL (calc)    CHOL/HDLC RATIO 3.3 <5.0 (calc)    NON-HDL CHOLESTEROL 166 (H) <130 mg/dL (calc)   TSH W REFLEX TO FREE T4 [12321][Q]   Result Value Ref Range    TSH W/REFLEX TO FT4 0.41 mIU/L   COMP METABOLIC PANEL [08266] [Q]   Result Value Ref Range    GLUCOSE 77 65 - 99 mg/dL    UREA NITROGEN (BUN) 15 7 - 25 mg/dL    CREATININE 0.77 0.50 - 0.99 mg/dL    EGFR 96 > OR = 60 mL/min/1.73m2    BUN/CREATININE RATIO NOT APPLICABLE 6 - 22 (calc)    SODIUM 140 135 - 146 mmol/L    POTASSIUM 4.4 3.5 - 5.3 mmol/L    CHLORIDE 104 98 - 110 mmol/L    CARBON DIOXIDE 30 20 - 32 mmol/L    CALCIUM 9.1 8.6 - 10.2 mg/dL     PROTEIN, TOTAL 7.1 6.1 - 8.1 g/dL    ALBUMIN 4.5 3.6 - 5.1 g/dL    GLOBULIN 2.6 1.9 - 3.7 g/dL (calc)    ALBUMIN/GLOBULIN RATIO 1.7 1.0 - 2.5 (calc)    BILIRUBIN, TOTAL 0.3 0.2 - 1.2 mg/dL    ALKALINE PHOSPHATASE 102 31 - 125 U/L    AST 15 10 - 35 U/L    ALT 11 6 - 29 U/L   CBC [6399] [Q]   Result Value Ref Range    WHITE BLOOD CELL COUNT 5.6 3.8 - 10.8 Thousand/uL    RED BLOOD CELL COUNT 4.45 3.80 - 5.10 Million/uL    HEMOGLOBIN 13.4 11.7 - 15.5 g/dL    HEMATOCRIT 39.3 35.0 - 45.0 %    MCV 88.3 80.0 - 100.0 fL    MCH 30.1 27.0 - 33.0 pg    MCHC 34.1 32.0 - 36.0 g/dL    RDW 13.1 11.0 - 15.0 %    PLATELET COUNT 269 140 - 400 Thousand/uL    MPV 10.9 7.5 - 12.5 fL    ABSOLUTE NEUTROPHILS 3,153 1,500 - 7,800 cells/uL    ABSOLUTE LYMPHOCYTES 1,994 850 - 3,900 cells/uL    ABSOLUTE MONOCYTES 297 200 - 950 cells/uL    ABSOLUTE EOSINOPHILS 118 15 - 500 cells/uL    ABSOLUTE BASOPHILS 39 0 - 200 cells/uL    NEUTROPHILS 56.3 %    LYMPHOCYTES 35.6 %    MONOCYTES 5.3 %    EOSINOPHILS 2.1 %    BASOPHILS 0.7 %       Wt Readings from Last 6 Encounters:   04/22/24 173 lb (78.5 kg)   04/12/23 158 lb (71.7 kg)   02/25/22 154 lb (69.9 kg)   01/06/21 153 lb (69.4 kg)   11/21/19 132 lb (59.9 kg)   10/30/18 150 lb (68 kg)     Body mass index is 27.48 kg/m².           Current Outpatient Medications   Medication Sig Dispense Refill    levothyroxine 100 MCG Oral Tab Take 1 tablet (100 mcg total) by mouth daily. 90 tablet 0    VALACYCLOVIR 500 MG Oral Tab TAKE 1 TABLET DAILY 90 tablet 3    betamethasone dipropionate 0.05 % External Lotion betamethasone dipropionate 0.05 % lotion      butalbital-aspirin-caffeine-codeine -59-30 MG Oral Cap TAKE 1 TO 2 CAPSULES BY MOUTH EVERY 6 HOURS AS NEEDED. MAX 6 PER DAY 30 capsule 0    CALCIUM OR Take 1 tablet by mouth twice a week.      Ascorbic Acid (VITAMIN C) 125 MG Oral Chew Tab Chew 2 tablets by mouth daily.      amitriptyline 10 MG Oral Tab TAKE 1 TABLET BY MOUTH ONCE DAILY ONE HOUR BEFORE BED       UBRELVY 100 MG Oral Tab Take 100 mg by mouth as needed. MAY TAKE A 2ND DOSE AFTER 2 HOURS IF NO RELIEF. MAX DOSE OF 200MG IN 24 HOURS      Levonorgestrel 20 MCG/24HR Intrauterine IUD 20 mcg (1 each total) by Intrauterine route once.      nystatin-triamcinolone 736450-1.1 UNIT/GM-% External Cream Apply thin film twice daily for 10-14 days (Patient taking differently: Apply thin film twice daily for 10-14 days as needed) 30 g 0    Cholecalciferol (VITAMIN D) 1000 UNITS Oral Cap Take 1 capsule by mouth daily.      Ferrous Sulfate (IRON) 325 (65 FE) MG Oral Tab Take 1 tablet by mouth at bedtime.      diazePAM 5 MG Oral Tab TAKE 0.5-1 TABLETS (2.5-5 MG TOTAL) BY MOUTH NIGHTLY AS NEEDED (BEFORE INTERCOURSE).      Iron-Vitamin C 100-250 MG Oral Tab iron   once a day      metroNIDAZOLE 0.75 % Vaginal Gel Place 1 Applicatorful vaginally nightly.      ondansetron (ZOFRAN) 4 mg tablet Place 1 Ring vaginally every 28 days.      polyethylene glycol, PEG 3350-KCl-NaBcb-NaCl-NaSulf, (GAVILYTE-C) 240 g Oral Recon Soln TAKE 4,000 ML BY MOUTH AS DIRECTED. TAKE PER [facility] PREP INSTRUCTION SHEET      EMGALITY 120 MG/ML Subcutaneous Solution Auto-injector Inject 1 mL into the skin every 30 (thirty) days.        Past Medical History:    Cancer (HCC)    Thyroid      Past Surgical History:   Procedure Laterality Date          Other surgical history  2010    Pt. reported having laparoscopic sx of her belly    Other surgical history  1996    Pt. reported having thyroid cancer sx    Thyroidectomy  1997    Total thyroidectomy      Family History   Problem Relation Age of Onset    Diabetes Paternal Grandmother     Stroke Paternal Grandmother     Other (hodgkin's disease) Paternal Grandfather     Cancer Maternal Grandfather         Lung Cancer    Breast Cancer Maternal Aunt 45    Cancer Father         Non Hodgkins Lymphoma    Stroke Father     High Cholesterol Mother     No Known Problems Sister     Anemia Daughter      Cancer Sister         Melanoma      Social History:   Social History     Socioeconomic History    Marital status:    Tobacco Use    Smoking status: Never    Smokeless tobacco: Never   Vaping Use    Vaping status: Never Used   Substance and Sexual Activity    Alcohol use: Not Currently     Alcohol/week: 0.0 standard drinks of alcohol     Comment: very rarely, 3 drinks yearly    Drug use: No   Other Topics Concern     Service No    Blood Transfusions No    Caffeine Concern Yes     Comment: 1 coffee or soda/day    Occupational Exposure No    Hobby Hazards No    Sleep Concern No    Stress Concern No    Weight Concern No    Special Diet No    Back Care No    Exercise Yes     Comment: Couple times a week    Bike Helmet No    Seat Belt Yes    Self-Exams No     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   Exercise: none.  Diet: doesn't watch     REVIEW OF SYSTEMS:   GENERAL: denies fevers, weakness, trouble sleeping or weight changes  SKIN: denies any unusual skin lesions or rashes  EYES:denies vision changes  HEENT: denies upper respiratory symptoms  LUNGS: denies cough or shortness of breath with exertion  CHEST:  denies breast changes or pain  CARDIOVASCULAR: denies chest pain or tightness on exertion: no edema  VASCULAR: denies leg cramps  GI: denies abdominal pain, bowel movement changes, blood in stool  : denies urinary problems, vaginal discharge or discomfort,  periods none with the IUD  , no urinary symptoms  MUSCULOSKELETAL: denies joint pain or stiffness  NEURO: History of migraine see above no tingling or dizziness  PSYCHE: denies depression or anxiety  HEMATOLOGIC: denies bleeding abnormalities  ENDOCRINE: denies temperature intolerance, polyuria, or excessive sweating.  Patient is hypothyroid  LYMPHATICS: denies swollen glands      EXAM:   /64   Pulse 88   Temp 97.9 °F (36.6 °C) (Temporal)   Resp 16   Ht 5' 6.54\" (1.69 m)   Wt 173 lb (78.5 kg)   SpO2 98%   BMI  27.48 kg/m²   Body mass index is 27.48 kg/m².   GENERAL: well developed, well nourished and in no apparent distress  SKIN: no rashes,no suspicious lesions  HEENT: atraumatic, normocephalic,ears, nose and throat COVID deferred  EYES: PERRLA, EOMI, sclera, conjunctiva are clear  NECK: supple,no adenopathy,no carotid bruits no mass along the thyroid had thyroidectomy  CHEST: no chest tenderness  BREAST: symmetrical, no suspicious mass, no nipple dimpling or discharge.  LUNGS: clear to auscultation bilateral, no rales, rhonchi or wheezing  CARDIO: RRR without murmur normal S1S2  ABD:  normal bowel sounds,soft, non tender, no masses, HSM or tenderness  : Deferred patient is asymptomatic  MUSCULOSKELETAL: gait go,l no gross M/S defect.  EXTREMITIES: no clubbing, cyanosis, or edema  NEURO: oriented times three, cranial nerves are grossly intact, no gross motor or sensory deficit.      ASSESSMENT AND PLAN:   Dianna Perez is a 49 year old female who presents for a complete physical exam.    Encounter Diagnoses   Name Primary?    Well female exam without gynecological exam Yes    Mixed hyperlipidemia     Post-surgical hypothyroidism     Screening for endocrine, nutritional, metabolic and immunity disorder     Encounter for lipid screening for cardiovascular disease     Visit for screening mammogram        Orders Placed This Encounter   Procedures    LIPID PANEL [7600] [Q]    CBC [6399] [Q]    COMP METABOLIC PANEL [11861] [Q]    TSH and Free T4       Meds & Refills for this Visit:  Requested Prescriptions      No prescriptions requested or ordered in this encounter       Imaging & Consults:  [facility] RAFA 2D+3D SCREENING BILAT (CPT=77067/83660)  CT CALCIUM SCORING  1. Well female exam without routine gynecological exam  .    Self breast exam explained. Health maintenance guidance given including vision and dental exams, vitamin D 1,000 iu daily with calcium 500 mg daily.  Lifestyle guidance provided recommended low fat diet  and aerobic exercise 30 minutes 3-4 times weekly.   Continue with Mirena IUD can be kept in for 8 years unless gets menstrual bleeding occurs then will remove and replace if wanted    2. Mixed hyperlipidemia  Low saturated fat diet and weight loss with exercise and reduction in carbohydrates  If hyperlipidemia consists persists will start on statin also encouraged heart scan  - CT CALCIUM SCORING; Future    3. Post-surgical hypothyroidism  Will continue on same thyroid medication dose change if TSH not normal  - TSH and Free T4    4. Screening for endocrine, nutritional, metabolic and immunity disorder  - CBC [6399] [Q]  - COMP METABOLIC PANEL [57113] [Q]  - TSH and Free T4    5. Encounter for lipid screening for cardiovascular disease  - LIPID PANEL [7600] [Q]    6. Visit for screening mammogram  - [facility] RAFA 2D+3D SCREENING BILAT (CPT=77067/48943); Future        The patient indicates understanding of these issues and agrees to the plan.  The patient is asked to return for complete physical yearly.

## 2024-04-30 DIAGNOSIS — E89.0 POST-SURGICAL HYPOTHYROIDISM: Primary | ICD-10-CM

## 2024-04-30 LAB
ABSOLUTE BASOPHILS: 28 CELLS/UL (ref 0–200)
ABSOLUTE EOSINOPHILS: 230 CELLS/UL (ref 15–500)
ABSOLUTE LYMPHOCYTES: 1848 CELLS/UL (ref 850–3900)
ABSOLUTE MONOCYTES: 319 CELLS/UL (ref 200–950)
ABSOLUTE NEUTROPHILS: 3175 CELLS/UL (ref 1500–7800)
ALBUMIN/GLOBULIN RATIO: 1.8 (CALC) (ref 1–2.5)
ALBUMIN: 4.4 G/DL (ref 3.6–5.1)
ALKALINE PHOSPHATASE: 116 U/L (ref 31–125)
ALT: 12 U/L (ref 6–29)
AST: 17 U/L (ref 10–35)
BASOPHILS: 0.5 %
BILIRUBIN, TOTAL: 0.2 MG/DL (ref 0.2–1.2)
BUN: 18 MG/DL (ref 7–25)
CALCIUM: 9.9 MG/DL (ref 8.6–10.2)
CARBON DIOXIDE: 30 MMOL/L (ref 20–32)
CHLORIDE: 105 MMOL/L (ref 98–110)
CHOL/HDLC RATIO: 2.8 (CALC)
CHOLESTEROL, TOTAL: 195 MG/DL
CREATININE: 0.71 MG/DL (ref 0.5–0.99)
EGFR: 105 ML/MIN/1.73M2
EOSINOPHILS: 4.1 %
GLOBULIN: 2.4 G/DL (CALC) (ref 1.9–3.7)
GLUCOSE: 88 MG/DL (ref 65–99)
HDL CHOLESTEROL: 69 MG/DL
HEMATOCRIT: 38.7 % (ref 35–45)
HEMOGLOBIN: 12.5 G/DL (ref 11.7–15.5)
LDL-CHOLESTEROL: 107 MG/DL (CALC)
LYMPHOCYTES: 33 %
MCH: 28.7 PG (ref 27–33)
MCHC: 32.3 G/DL (ref 32–36)
MCV: 88.8 FL (ref 80–100)
MONOCYTES: 5.7 %
MPV: 11.1 FL (ref 7.5–12.5)
NEUTROPHILS: 56.7 %
NON-HDL CHOLESTEROL: 126 MG/DL (CALC)
PLATELET COUNT: 251 THOUSAND/UL (ref 140–400)
POTASSIUM: 4.6 MMOL/L (ref 3.5–5.3)
PROTEIN, TOTAL: 6.8 G/DL (ref 6.1–8.1)
RDW: 12.3 % (ref 11–15)
RED BLOOD CELL COUNT: 4.36 MILLION/UL (ref 3.8–5.1)
SODIUM: 143 MMOL/L (ref 135–146)
T4, FREE: 1.3 NG/DL (ref 0.8–1.8)
TRIGLYCERIDES: 92 MG/DL
TSH: 0.11 MIU/L
WHITE BLOOD CELL COUNT: 5.6 THOUSAND/UL (ref 3.8–10.8)

## 2024-04-30 RX ORDER — LEVOTHYROXINE SODIUM 88 UG/1
88 TABLET ORAL
Qty: 90 TABLET | Refills: 0 | Status: SHIPPED | OUTPATIENT
Start: 2024-04-30

## 2024-05-11 ENCOUNTER — HOSPITAL ENCOUNTER (OUTPATIENT)
Dept: CT IMAGING | Age: 49
Discharge: HOME | End: 2024-05-11
Attending: FAMILY MEDICINE

## 2024-05-11 DIAGNOSIS — E78.2 MIXED HYPERLIPIDEMIA: ICD-10-CM

## 2024-07-21 DIAGNOSIS — E89.0 POST-SURGICAL HYPOTHYROIDISM: ICD-10-CM

## 2024-07-22 RX ORDER — LEVOTHYROXINE SODIUM 88 UG/1
88 TABLET ORAL
Qty: 90 TABLET | Refills: 0 | Status: SHIPPED | OUTPATIENT
Start: 2024-07-22

## 2024-07-23 DIAGNOSIS — E89.0 POST-SURGICAL HYPOTHYROIDISM: Primary | ICD-10-CM

## 2024-07-23 LAB
T4, FREE: 1.3 NG/DL (ref 0.8–1.8)
TSH: 0.37 MIU/L

## 2024-09-06 NOTE — TELEPHONE ENCOUNTER
----- Message from Tyrese Chavira PA-C sent at 3/2/2020  4:50 PM CST -----  Follow up in office to discuss. Iron levels are low including iron storage i.e. the ferritin level.   Take the over-the-counter one of the 325 mg of iron daily with 500 mg vitam NAD. VSS. Afebrile. Neck supple. Lungs clear. ABD soft, non tender. B/L lower leg; swelling, ankle tender with diminished ROMs, eryth on anterior aspect, and calf tender. N/V- intact. No focal neuro deficit.

## 2024-10-30 DIAGNOSIS — E89.0 POST-SURGICAL HYPOTHYROIDISM: ICD-10-CM

## 2024-10-30 RX ORDER — LEVOTHYROXINE SODIUM 88 UG/1
88 TABLET ORAL
Qty: 90 TABLET | Refills: 1 | Status: SHIPPED | OUTPATIENT
Start: 2024-10-30

## 2025-01-23 LAB
T4, FREE: 1.6 NG/DL (ref 0.8–1.8)
TSH: 0.2 MIU/L

## 2025-01-24 DIAGNOSIS — E89.0 POST-SURGICAL HYPOTHYROIDISM: Primary | ICD-10-CM

## 2025-01-24 RX ORDER — LEVOTHYROXINE SODIUM 75 UG/1
75 TABLET ORAL
Qty: 90 TABLET | Refills: 0 | Status: SHIPPED | OUTPATIENT
Start: 2025-01-24

## 2025-02-23 DIAGNOSIS — Z86.19 HISTORY OF HERPES GENITALIS: ICD-10-CM

## 2025-02-24 RX ORDER — VALACYCLOVIR HYDROCHLORIDE 500 MG/1
500 TABLET, FILM COATED ORAL DAILY
Qty: 90 TABLET | Refills: 0 | Status: SHIPPED | OUTPATIENT
Start: 2025-02-24 | End: 2025-05-23

## 2025-04-03 DIAGNOSIS — E89.0 POST-SURGICAL HYPOTHYROIDISM: Primary | ICD-10-CM

## 2025-04-03 LAB
T4, FREE: 1.6 NG/DL (ref 0.8–1.8)
TSH: 0.36 MIU/L

## 2025-04-03 RX ORDER — LEVOTHYROXINE SODIUM 75 UG/1
75 TABLET ORAL
Qty: 90 TABLET | Refills: 2 | Status: SHIPPED | OUTPATIENT
Start: 2025-04-03

## 2025-04-03 RX ORDER — METHYLPHENIDATE HYDROCHLORIDE 20 MG/1
20 TABLET ORAL 3 TIMES DAILY
COMMUNITY
Start: 2025-03-11

## 2025-04-23 ENCOUNTER — OFFICE VISIT (OUTPATIENT)
Dept: FAMILY MEDICINE CLINIC | Facility: CLINIC | Age: 50
End: 2025-04-23
Payer: COMMERCIAL

## 2025-04-23 VITALS
DIASTOLIC BLOOD PRESSURE: 66 MMHG | HEART RATE: 98 BPM | RESPIRATION RATE: 16 BRPM | HEIGHT: 66.5 IN | TEMPERATURE: 98 F | OXYGEN SATURATION: 98 % | BODY MASS INDEX: 23.22 KG/M2 | SYSTOLIC BLOOD PRESSURE: 112 MMHG | WEIGHT: 146.19 LBS

## 2025-04-23 DIAGNOSIS — Z13.6 ENCOUNTER FOR LIPID SCREENING FOR CARDIOVASCULAR DISEASE: ICD-10-CM

## 2025-04-23 DIAGNOSIS — Z11.51 SCREENING FOR HPV (HUMAN PAPILLOMAVIRUS): ICD-10-CM

## 2025-04-23 DIAGNOSIS — Z12.4 SCREENING FOR CERVICAL CANCER: ICD-10-CM

## 2025-04-23 DIAGNOSIS — Z13.0 SCREENING FOR ENDOCRINE, NUTRITIONAL, METABOLIC AND IMMUNITY DISORDER: ICD-10-CM

## 2025-04-23 DIAGNOSIS — Z12.31 VISIT FOR SCREENING MAMMOGRAM: ICD-10-CM

## 2025-04-23 DIAGNOSIS — Z13.228 SCREENING FOR ENDOCRINE, NUTRITIONAL, METABOLIC AND IMMUNITY DISORDER: ICD-10-CM

## 2025-04-23 DIAGNOSIS — N95.1 MENOPAUSAL VASOMOTOR SYNDROME: ICD-10-CM

## 2025-04-23 DIAGNOSIS — Z13.220 ENCOUNTER FOR LIPID SCREENING FOR CARDIOVASCULAR DISEASE: ICD-10-CM

## 2025-04-23 DIAGNOSIS — M13.80 MENOPAUSAL ARTHRITIS: ICD-10-CM

## 2025-04-23 DIAGNOSIS — G43.909 MIGRAINE WITHOUT STATUS MIGRAINOSUS, NOT INTRACTABLE, UNSPECIFIED MIGRAINE TYPE: ICD-10-CM

## 2025-04-23 DIAGNOSIS — Z30.432 ENCOUNTER FOR IUD REMOVAL: ICD-10-CM

## 2025-04-23 DIAGNOSIS — E89.0 POST-SURGICAL HYPOTHYROIDISM: ICD-10-CM

## 2025-04-23 DIAGNOSIS — Z13.21 SCREENING FOR ENDOCRINE, NUTRITIONAL, METABOLIC AND IMMUNITY DISORDER: ICD-10-CM

## 2025-04-23 DIAGNOSIS — Z01.419 WELL FEMALE EXAM WITH ROUTINE GYNECOLOGICAL EXAM: Primary | ICD-10-CM

## 2025-04-23 DIAGNOSIS — Z13.29 SCREENING FOR ENDOCRINE, NUTRITIONAL, METABOLIC AND IMMUNITY DISORDER: ICD-10-CM

## 2025-04-23 DIAGNOSIS — Z78.0 POST-MENOPAUSAL: ICD-10-CM

## 2025-04-23 PROCEDURE — 99396 PREV VISIT EST AGE 40-64: CPT | Performed by: FAMILY MEDICINE

## 2025-04-23 PROCEDURE — 87624 HPV HI-RISK TYP POOLED RSLT: CPT | Performed by: FAMILY MEDICINE

## 2025-04-23 PROCEDURE — 88175 CYTOPATH C/V AUTO FLUID REDO: CPT | Performed by: FAMILY MEDICINE

## 2025-04-23 PROCEDURE — 58301 REMOVE INTRAUTERINE DEVICE: CPT | Performed by: FAMILY MEDICINE

## 2025-04-23 PROCEDURE — 99213 OFFICE O/P EST LOW 20 MIN: CPT | Performed by: FAMILY MEDICINE

## 2025-04-23 RX ORDER — ESTRADIOL 0.1 MG/G
1 CREAM VAGINAL
Qty: 42.5 G | Refills: 1 | Status: SHIPPED | OUTPATIENT
Start: 2025-04-23

## 2025-04-23 RX ORDER — CODEINE/BUTALBITAL/ASA/CAFFEIN 30-50-325
1 CAPSULE ORAL 2 TIMES DAILY PRN
Qty: 20 CAPSULE | Refills: 0 | Status: SHIPPED | OUTPATIENT
Start: 2025-04-23

## 2025-04-23 NOTE — PROGRESS NOTES
HPI:   Dianna Perez is a 50 year old female who presents for a complete physical exam, requesting IUD removal having menopausal symptoms wanting to discuss estrogen replacement.    Hypothyroid history thyroidectomy for papillary thyroid cancer  Hypothyroidism secondary to surgical intervention for thyroid cancer thyroidectomy  thyroid cancer papillary and follicular has not been seeing endocrinologist for years they released her to her PCP several years Feels asymptomatic with her thyroid.  Follow-up on hypothyroidism taking levothyroxine 75 mcg daily  25 TSH 0.36 no hyperthyroid symptoms        --- Joint pain/vasomotor symptoms  Menopausal symptoms hot flashes achy joints vaginal dryness and atrophy discomfort at times started on ADHD medication because of brain fog.  Hand and wrist stiffness and pain over the past year since menopause symptoms started  No personal or family history of PE, DVT or hypercoagulability known.    Patient is a non-smoker.  Has Mirena IUD in from 6 years ago wants removal and wants to discuss estrogen replacement if no menstrual cycles occur after IUD is taken out    Preventative care female:    Dental exams UTD    Eye exams UTD      Immunizations: Tdap 2021, COVID-19 deferred, influenza deferred    Skin cancer screening UTD dermatologist squamous cell carcinoma nose    Colon cancer screening  not indicated;    colonoscopy completed 10/12/2023 colonoscopy due 10/12/1929;  no family history colon cancer      Lung cancer screening   not indicated  no history of smoking    Breast cancer screening: Completed 5/15/2024 mammogram and breast cancer    Cervical cancer screening: normal  Pap smear     Osteoporosis screening:  Bone density 2/3/2021 was normal   No family history of hip fracture  Taking calcium and vitamin D supplement   Walkingo weight bearing exercise    Heart disease screenin  Ultrafast CT of the heart 0 calcification    Alcohol screening   none  per week      Sleep apnea BMI 23snoring or witnessed apnea  no     Depression/anxiety screening PHQ9  0  and JENNY-7 0    Exercise  walking      DIet  avoiding processed foods carbohydrates and saturated fats      Family history:  Sister melanoma; Dad nonhodgkins, PGF hodgkins lymphoma; Mat aunt breast cancer  Mom and sister with hyperlipidemia No FH AMI /CAD     Social history: Remarried, works as a , non-smoker alcohol is rare, diet is healthy exercise walking  3 adult children      Gynecology  Contraception Mirena IUD 7 years old due for removal no bleeding for 6 years  Menopausal symptoms hot flashes achy joints vaginal dryness and atrophy discomfort at times started on ADHD medication because of brain fog.  Sexual activity with    symptoms  no discharge, no itching, no burning no urinary symptoms has intermittent vaginal dryness and discomfort  Last Pap smear 2/28/2022 HPV negative, BV cells are normal   Abnormal pap 25 years ago did cryo procedure normal since Pap   STD history herpes history no outbreaks for years and is on daily Valtrex wants to be on it to protect her      --- Hyperlipidemia  FH high cholesterol Mom and sister.  No RX heart scan 2024 no plaque      ---Herpes   No breakouts last one years ago no issues takes Valtrex once a day is sexually active with  no side effects       ---Migraines   Presently following neurologist is on Emgality helps.    Takes Ubrelvy as needed but for breakthrough migraines requesting Fiornol with codeine last prescribed 2/26/2022  Elavil 10 mg at night for prevention      Results for orders placed or performed in visit on 04/23/25   Hpv Dna  High Risk , Thin Prep Collect    Collection Time: 04/23/25 11:32 AM   Result Value Ref Range    HPV High Risk Negative Negative    HPV Source Cervical/endocervical        Wt Readings from Last 6 Encounters:   04/23/25 146 lb 3.2 oz (66.3 kg)   04/22/24 173 lb (78.5 kg)   04/12/23 158 lb (71.7 kg)   02/25/22  154 lb (69.9 kg)   01/06/21 153 lb (69.4 kg)   11/21/19 132 lb (59.9 kg)     Body mass index is 23.24 kg/m².           Current Outpatient Medications   Medication Sig Dispense Refill    butalbital-aspirin-caffeine-codeine -43-30 MG Oral Cap Take 1 capsule by mouth 2 (two) times daily as needed for Pain. 20 capsule 0    estradiol 0.1 MG/GM Vaginal Cream Place 1 g vaginally twice a week. 42.5 g 1    methylphenidate 20 MG Oral Tab Take 1 tablet (20 mg total) by mouth 3 (three) times daily. (Patient taking differently: Take 1 tablet (20 mg total) by mouth in the morning and 1 tablet (20 mg total) in the evening and 1 tablet (20 mg total) before bedtime. 20 mg once daily  .)      levothyroxine 75 MCG Oral Tab Take 1 tablet (75 mcg total) by mouth before breakfast. 90 tablet 2    valACYclovir 500 MG Oral Tab Take 1 tablet (500 mg total) by mouth daily. 90 tablet 0    Iron-Vitamin C 100-250 MG Oral Tab iron   once a day      betamethasone dipropionate 0.05 % External Lotion betamethasone dipropionate 0.05 % lotion      CALCIUM OR Take 1 tablet by mouth twice a week.      Ascorbic Acid (VITAMIN C) 125 MG Oral Chew Tab Chew 2 tablets by mouth daily.      amitriptyline 10 MG Oral Tab TAKE 1 TABLET BY MOUTH ONCE DAILY ONE HOUR BEFORE BED      EMGALITY 120 MG/ML Subcutaneous Solution Auto-injector Inject 1 mL into the skin every 30 (thirty) days.      UBRELVY 100 MG Oral Tab Take 100 mg by mouth as needed. MAY TAKE A 2ND DOSE AFTER 2 HOURS IF NO RELIEF. MAX DOSE OF 200MG IN 24 HOURS      Levonorgestrel 20 MCG/24HR Intrauterine IUD 20 mcg (1 each total) by Intrauterine route once.      Cholecalciferol (VITAMIN D) 1000 UNITS Oral Cap Take 1 capsule by mouth daily.      Ferrous Sulfate (IRON) 325 (65 FE) MG Oral Tab Take 1 tablet by mouth at bedtime.      nystatin-triamcinolone 770320-8.1 UNIT/GM-% External Cream Apply thin film twice daily for 10-14 days (Patient taking differently: Apply thin film twice daily for 10-14  days as needed) 30 g 0      Past Medical History:    Cancer (HCC)    Thyroid      Past Surgical History:   Procedure Laterality Date      ,    Other surgical history  2010    Pt. reported having laparoscopic sx of her belly    Other surgical history  1996    Pt. reported having thyroid cancer sx    Thyroidectomy  1997    Total thyroidectomy      Family History   Problem Relation Age of Onset    Diabetes Paternal Grandmother     Stroke Paternal Grandmother     Other (hodgkin's disease) Paternal Grandfather     Cancer Maternal Grandfather         Lung Cancer    Breast Cancer Maternal Aunt 45    Cancer Father         Non Hodgkins Lymphoma    Stroke Father     High Cholesterol Mother     No Known Problems Sister     Anemia Daughter     Cancer Sister         Melanoma      Social History:   Social History     Socioeconomic History    Marital status:    Tobacco Use    Smoking status: Never    Smokeless tobacco: Never   Vaping Use    Vaping status: Never Used   Substance and Sexual Activity    Alcohol use: Not Currently     Alcohol/week: 0.0 standard drinks of alcohol     Comment: very rarely, 3 drinks yearly    Drug use: No    Sexual activity: Yes     Partners: Male   Other Topics Concern     Service No    Blood Transfusions No    Caffeine Concern Yes     Comment: 1 coffee or soda/day    Occupational Exposure No    Hobby Hazards No    Sleep Concern No    Stress Concern No    Weight Concern No    Special Diet No    Back Care No    Exercise Yes     Comment: Couple times a week    Bike Helmet No    Seat Belt Yes    Self-Exams No     Social Drivers of Health     Food Insecurity: No Food Insecurity (2025)    NCSS - Food Insecurity     Worried About Running Out of Food in the Last Year: No     Ran Out of Food in the Last Year: No   Transportation Needs: No Transportation Needs (2025)    NCSS - Transportation     Lack of Transportation: No   Housing Stability: Not At Risk (2025)     NCSS - Housing/Utilities     Has Housing: Yes     Worried About Losing Housing: No     Unable to Get Utilities: No     Occ:  . : no. Children: 2.   Exercise: none.  Diet: doesn't watch     REVIEW OF SYSTEMS:   GENERAL: denies fevers, weakness, trouble sleeping or weight changes  SKIN: denies any unusual skin lesions or rashes  EYES:denies vision changes  HEENT: denies upper respiratory symptoms  LUNGS: denies cough or shortness of breath with exertion  CHEST:  denies breast changes or pain  CARDIOVASCULAR: denies chest pain or tightness on exertion: no edema  VASCULAR: denies leg cramps  GI: denies abdominal pain, bowel movement changes, blood in stool  : Intermittent vaginal  dryness denies urinary problems, vaginal discharge or discomfort,  periods none with the IUD  , no urinary symptoms  MUSCULOSKELETAL: denies joint pain or stiffness  NEURO: History of migraine see above no tingling or dizziness  PSYCHE: denies depression or anxiety  HEMATOLOGIC: denies bleeding abnormalities  ENDOCRINE: denies temperature intolerance, polyuria, or excessive sweating.  Patient is hypothyroid  LYMPHATICS: denies swollen glands      EXAM:   /66 (BP Location: Left arm, Patient Position: Sitting, Cuff Size: adult)   Pulse 98   Temp 97.5 °F (36.4 °C) (Temporal)   Resp 16   Ht 5' 6.5\" (1.689 m)   Wt 146 lb 3.2 oz (66.3 kg)   SpO2 98%   BMI 23.24 kg/m²   Body mass index is 23.24 kg/m².   GENERAL: well developed, well nourished and in no apparent distress  SKIN: no rashes,no suspicious lesions  HEENT: atraumatic, normocephalic,ears, nose and throat COVID deferred  EYES: PERRLA, EOMI, sclera, conjunctiva are clear  NECK: supple,no adenopathy,no carotid bruits no mass along the thyroid had thyroidectomy  CHEST: no chest tenderness  BREAST: symmetrical, no suspicious mass, no nipple dimpling or discharge.  LUNGS: clear to auscultation bilateral, no rales, rhonchi or wheezing  CARDIO: RRR without murmur  normal S1S2  ABD:  normal bowel sounds,soft, non tender, no masses, HSM or tenderness  :  normal external labia without lesions or erythema, introitus and vaginal vault normal with no lesions, cervix no erythema, no lesions, normal discharge, strings present.  Pap smear obtained.  No cervical motion tenderness and no pelvic mass appreciated.  IUD strings visualized IUD removed patient tolerated procedure well with minimal discomfort.  No bleeding afterwards.    MUSCULOSKELETAL: gait go,l no gross M/S defect.  EXTREMITIES: no clubbing, cyanosis, or edema  NEURO: oriented times three, cranial nerves are grossly intact, no gross motor or sensory deficit.      ASSESSMENT AND PLAN:   Dianna Perez is a 50 year old female who presents for a complete physical exam.    Encounter Diagnoses   Name Primary?    Well female exam with routine gynecological exam Yes    Menopausal arthritis     Menopausal vasomotor syndrome     Migraine without status migrainosus, not intractable, unspecified migraine type     Post-surgical hypothyroidism     Encounter for IUD removal     Screening for cervical cancer     Screening for HPV (human papillomavirus)     Encounter for lipid screening for cardiovascular disease     Screening for endocrine, nutritional, metabolic and immunity disorder     Visit for screening mammogram     Post-menopausal        Orders Placed This Encounter   Procedures    Hpv Dna  High Risk , Thin Prep Collect    LIPID PANEL [7600] [Q]    CBC [6399] [Q]    COMP METABOLIC PANEL [85954] [Q]    TSH and Free T4    FSH AND LH [7137] [Q]    Estradiol [E]    Sed Rate, Westergren (Automated) [E]    C-Reactive Protein [E]    Rheumatoid Arthritis Factor [E]    Cyclic Citrullinate Pep. IGG [E]    ANDRE COMPREHENSIVE PANEL [249] [Q]    ThinPrep PAP Smear    THINPREP PAP SMEAR ONLY       Meds & Refills for this Visit:  Requested Prescriptions     Signed Prescriptions Disp Refills    butalbital-aspirin-caffeine-codeine -44-30  MG Oral Cap 20 capsule 0     Sig: Take 1 capsule by mouth 2 (two) times daily as needed for Pain.    estradiol 0.1 MG/GM Vaginal Cream 42.5 g 1     Sig: Place 1 g vaginally twice a week.       Imaging & Consults:  [facility] RAFA 2D+3D SCREENING BILAT (CPT=77067/44690)  XR DEXA BONE DENSITOMETRY (CPT=77080)  1. Well female exam with routine gynecological exam  .    Self breast exam explained. Health maintenance guidance given including vision and dental exams, vitamin D 1,000 iu daily with calcium 500 mg daily.  Lifestyle guidance provided recommended low fat diet and aerobic exercise 30 minutes 3-4 times weekly.   Condoms will sexually active in 3 months get LH, FSH and estradiol if menopausal will start on estrogen/progesterone patch    2. Menopausal arthritis  Tumeric, vitamin D rule out RA  - Sed Claudia Gonzales (Automated) [E]  - C-Reactive Protein [E]  - Rheumatoid Arthritis Factor [E]  - Cyclic Citrullinate Pep. IGG [E]  - ANDRE COMPREHENSIVE PANEL [249] [Q]    3. Menopausal vasomotor syndrome  Reviewed medication benefits and side effects.   If LH and FSH and estradiol <15  indicate menopause will prescribe estrogen progesterone patch.    - estradiol 0.1 MG/GM Vaginal Cream; Place 1 g vaginally twice a week.  Dispense: 42.5 g; Refill: 1    4. Migraine without status migrainosus, not intractable, unspecified migraine type  Reviewed medication benefits and side effects.   Use, risks, benefits and precautions of butalbital with codeine discussed. Including not to drive, operate machinery or drink alcohol when taking this medication.      [facility] Data Reviewed.    - butalbital-aspirin-caffeine-codeine -09-30 MG Oral Cap; Take 1 capsule by mouth 2 (two) times daily as needed for Pain.  Dispense: 20 capsule; Refill: 0    5. Post-surgical hypothyroidism  Continue with levothyroxine 75 mcg daily  Reviewed medication benefits and side effects.   - TSH and Free T4    6. Encounter for IUD removal  Successful removal  of IUD    7. Screening for cervical cancer  - ThinPrep PAP Smear; Future  - ThinPrep PAP Smear  - THINPREP PAP SMEAR ONLY    8. Screening for HPV (human papillomavirus)  - Hpv Dna  High Risk , Thin Prep Collect; Future  - Hpv Dna  High Risk , Thin Prep Collect    9. Encounter for lipid screening for cardiovascular disease  - LIPID PANEL [2530] [Q]    10. Screening for endocrine, nutritional, metabolic and immunity disorder  - CBC [5099] [Q]  - COMP METABOLIC PANEL [52724] [Q]  - TSH and Free T4    11. Visit for screening mammogram  - [facility] RAFA 2D+3D SCREENING BILAT (CPT=77067/54121); Future    12. Post-menopausal  - XR DEXA BONE DENSITOMETRY (CPT=77080); Future      The patient indicates understanding of these issues and agrees to the plan.  The patient is asked to return for complete physical yearly.

## 2025-04-24 ENCOUNTER — PATIENT MESSAGE (OUTPATIENT)
Dept: FAMILY MEDICINE CLINIC | Facility: CLINIC | Age: 50
End: 2025-04-24

## 2025-04-24 PROBLEM — M13.80: Status: ACTIVE | Noted: 2025-04-24

## 2025-04-24 PROBLEM — N95.1 MENOPAUSAL VASOMOTOR SYNDROME: Status: ACTIVE | Noted: 2025-04-24

## 2025-04-24 LAB — HPV E6+E7 MRNA CVX QL NAA+PROBE: NEGATIVE

## 2025-04-29 NOTE — PROGRESS NOTES
Pap smear is satisfactory for evaluation negative for malignancy and negative HPV repeat in 3 years

## 2025-05-06 ENCOUNTER — TELEPHONE (OUTPATIENT)
Dept: FAMILY MEDICINE CLINIC | Facility: CLINIC | Age: 50
End: 2025-05-06

## 2025-05-06 NOTE — TELEPHONE ENCOUNTER
Bailey from nuclear medicine department [facility] calling to obtain a Dexa Scan order for patient.   Please fax order to   [phone]    P# [phone]

## 2025-05-08 RX ORDER — METRONIDAZOLE 7.5 MG/G
1 GEL VAGINAL NIGHTLY
Qty: 1 EACH | Refills: 0 | Status: SHIPPED | OUTPATIENT
Start: 2025-05-08 | End: 2025-05-13

## 2025-05-08 NOTE — TELEPHONE ENCOUNTER
Patient has slight smell and itching with raw feeling, she is concerned she has BV.     Please review and advise

## 2025-05-19 ENCOUNTER — MED REC SCAN ONLY (OUTPATIENT)
Dept: FAMILY MEDICINE CLINIC | Facility: CLINIC | Age: 50
End: 2025-05-19

## 2025-05-22 DIAGNOSIS — Z86.19 HISTORY OF HERPES GENITALIS: ICD-10-CM

## 2025-05-23 RX ORDER — VALACYCLOVIR HYDROCHLORIDE 500 MG/1
500 TABLET, FILM COATED ORAL DAILY
Qty: 90 TABLET | Refills: 3 | Status: SHIPPED | OUTPATIENT
Start: 2025-05-23

## 2025-08-04 LAB
ABSOLUTE BASOPHILS: 53 CELLS/UL (ref 0–200)
ABSOLUTE EOSINOPHILS: 154 CELLS/UL (ref 15–500)
ABSOLUTE LYMPHOCYTES: 1850 CELLS/UL (ref 850–3900)
ABSOLUTE MONOCYTES: 334 CELLS/UL (ref 200–950)
ABSOLUTE NEUTROPHILS: 2910 CELLS/UL (ref 1500–7800)
ALBUMIN/GLOBULIN RATIO: 2 (CALC) (ref 1–2.5)
ALBUMIN: 4.7 G/DL (ref 3.6–5.1)
ALKALINE PHOSPHATASE: 107 U/L (ref 31–125)
ALT: 23 U/L (ref 6–29)
ANA SCREEN, IFA: NEGATIVE
AST: 29 U/L (ref 10–35)
BASOPHILS: 1 %
BILIRUBIN, TOTAL: 0.5 MG/DL (ref 0.2–1.2)
BUN: 21 MG/DL (ref 7–25)
C-REACTIVE PROTEIN: <3 MG/L
CALCIUM: 9.7 MG/DL (ref 8.6–10.2)
CARBON DIOXIDE: 30 MMOL/L (ref 20–32)
CHLORIDE: 102 MMOL/L (ref 98–110)
CHOL/HDLC RATIO: 3 (CALC)
CHOLESTEROL, TOTAL: 262 MG/DL
CREATININE: 0.78 MG/DL (ref 0.5–0.99)
CYCLIC CITRULLINATED$PEPTIDE (CCP) AB (IGG): <16 UNITS
EGFR: 93 ML/MIN/1.73M2
EOSINOPHILS: 2.9 %
ESTRADIOL: 65 PG/ML
FSH: 91.5 MIU/ML
GLOBULIN: 2.4 G/DL (CALC) (ref 1.9–3.7)
GLUCOSE: 81 MG/DL (ref 65–99)
HDL CHOLESTEROL: 88 MG/DL
HEMATOCRIT: 39.7 % (ref 35–45)
HEMOGLOBIN: 12.7 G/DL (ref 11.7–15.5)
LDL-CHOLESTEROL: 156 MG/DL (CALC)
LH: 47.1 MIU/ML
LYMPHOCYTES: 34.9 %
MCH: 29.7 PG (ref 27–33)
MCHC: 32 G/DL (ref 32–36)
MCV: 92.8 FL (ref 80–100)
MONOCYTES: 6.3 %
MPV: 10.9 FL (ref 7.5–12.5)
NEUTROPHILS: 54.9 %
NON-HDL CHOLESTEROL: 174 MG/DL (CALC)
PLATELET COUNT: 267 THOUSAND/UL (ref 140–400)
POTASSIUM: 4.6 MMOL/L (ref 3.5–5.3)
PROTEIN, TOTAL: 7.1 G/DL (ref 6.1–8.1)
RDW: 13.5 % (ref 11–15)
RED BLOOD CELL COUNT: 4.28 MILLION/UL (ref 3.8–5.1)
RHEUMATOID FACTOR: <10 IU/ML
SED RATE BY MODIFIED$WESTERGREN: 22 MM/H
SODIUM: 138 MMOL/L (ref 135–146)
T4, FREE: 1.2 NG/DL (ref 0.8–1.8)
TRIGLYCERIDES: 81 MG/DL
TSH: 2.75 MIU/L
WHITE BLOOD CELL COUNT: 5.3 THOUSAND/UL (ref 3.8–10.8)

## 2025-08-05 ENCOUNTER — RESULTS FOLLOW-UP (OUTPATIENT)
Dept: FAMILY MEDICINE CLINIC | Facility: CLINIC | Age: 50
End: 2025-08-05

## 2025-08-05 DIAGNOSIS — E78.2 MIXED HYPERLIPIDEMIA: Primary | ICD-10-CM

## (undated) DIAGNOSIS — Z85.850 HISTORY OF PAPILLARY ADENOCARCINOMA OF THYROID: ICD-10-CM

## (undated) DIAGNOSIS — E78.2 MIXED HYPERLIPIDEMIA: ICD-10-CM

## (undated) DIAGNOSIS — E89.0 POST-SURGICAL HYPOTHYROIDISM: Primary | ICD-10-CM

## (undated) NOTE — LETTER
12/17/17        Chanda Unger [id number]      Dear Brandon Tate,    [id number] records indicate that you have outstanding lab work and or testing that was ordered for you and has not yet been completed:          TSH and Free T4 [E]      Triiod